# Patient Record
Sex: MALE | Race: AMERICAN INDIAN OR ALASKA NATIVE | NOT HISPANIC OR LATINO | URBAN - METROPOLITAN AREA
[De-identification: names, ages, dates, MRNs, and addresses within clinical notes are randomized per-mention and may not be internally consistent; named-entity substitution may affect disease eponyms.]

---

## 2019-04-02 ENCOUNTER — HISTORICAL (OUTPATIENT)
Dept: ADMINISTRATIVE | Facility: HOSPITAL | Age: 29
End: 2019-04-02

## 2019-04-02 LAB
ALBUMIN SERPL-MCNC: 5.2 G/DL (ref 3.5–5.5)
ALBUMIN/GLOB SERPL: 2 {RATIO} (ref 1.2–2.2)
ALP SERPL-CCNC: 81 IU/L (ref 39–117)
ALT SERPL-CCNC: 21 IU/L (ref 0–44)
AST SERPL-CCNC: 29 IU/L (ref 0–40)
BASOPHILS # BLD AUTO: 0.1 X10E3/UL (ref 0–0.2)
BASOPHILS NFR BLD AUTO: 1 %
BILIRUB SERPL-MCNC: 0.7 MG/DL (ref 0–1.2)
BUN SERPL-MCNC: 14 MG/DL (ref 6–20)
CALCIUM SERPL-MCNC: 10 MG/DL (ref 8.7–10.2)
CHLORIDE SERPL-SCNC: 104 MMOL/L (ref 96–106)
CHOLEST SERPL-MCNC: 129 MG/DL (ref 100–199)
CHOLEST/HDLC SERPL: 2.1 RATIO (ref 0–5)
CO2 SERPL-SCNC: 24 MMOL/L (ref 20–29)
CREAT SERPL-MCNC: 1.07 MG/DL (ref 0.76–1.27)
CREAT/UREA NIT SERPL: 13 (ref 9–20)
EOSINOPHIL # BLD AUTO: 0.2 X10E3/UL (ref 0–0.4)
EOSINOPHIL NFR BLD AUTO: 2 %
ERYTHROCYTE [DISTWIDTH] IN BLOOD BY AUTOMATED COUNT: 12.1 % (ref 12.3–15.4)
GLOBULIN SER-MCNC: 2.6 G/DL (ref 1.5–4.5)
GLUCOSE SERPL-MCNC: 90 MG/DL (ref 65–99)
HCT VFR BLD AUTO: 49.6 % (ref 37.5–51)
HDLC SERPL-MCNC: 61 MG/DL
HGB BLD-MCNC: 16.7 G/DL (ref 13–17.7)
LDLC SERPL CALC-MCNC: 59 MG/DL (ref 0–99)
LYMPHOCYTES # BLD AUTO: 2.4 X10E3/UL (ref 0.7–3.1)
LYMPHOCYTES NFR BLD AUTO: 28 %
MCH RBC QN AUTO: 30.5 PG (ref 26.6–33)
MCHC RBC AUTO-ENTMCNC: 33.7 G/DL (ref 31.5–35.7)
MCV RBC AUTO: 91 FL (ref 79–97)
MONOCYTES # BLD AUTO: 0.6 X10E3/UL (ref 0.1–0.9)
MONOCYTES NFR BLD AUTO: 7 %
NEUTROPHILS # BLD AUTO: 5.5 X10E3/UL (ref 1.4–7)
NEUTROPHILS NFR BLD AUTO: 62 %
PLATELET # BLD AUTO: 240 X10E3/UL (ref 150–379)
POTASSIUM SERPL-SCNC: 4.5 MMOL/L (ref 3.5–5.2)
PROT SERPL-MCNC: 7.8 G/DL (ref 6–8.5)
RBC # BLD AUTO: 5.48 X10(6)/MCL (ref 4.14–5.8)
SODIUM SERPL-SCNC: 144 MMOL/L (ref 134–144)
TRIGL SERPL-MCNC: 44 MG/DL (ref 0–149)
TSH SERPL-ACNC: 1.16 MIU/ML (ref 0.45–4.5)
VLDLC SERPL CALC-MCNC: 9 MG/DL (ref 5–40)
WBC # SPEC AUTO: 8.7 X10E3/UL (ref 3.4–10.8)

## 2020-10-13 ENCOUNTER — HISTORICAL (OUTPATIENT)
Dept: LAB | Facility: HOSPITAL | Age: 30
End: 2020-10-13

## 2020-10-27 ENCOUNTER — HISTORICAL (OUTPATIENT)
Dept: RESPIRATORY THERAPY | Facility: HOSPITAL | Age: 30
End: 2020-10-27

## 2022-04-09 ENCOUNTER — HISTORICAL (OUTPATIENT)
Dept: ADMINISTRATIVE | Facility: HOSPITAL | Age: 32
End: 2022-04-09

## 2022-04-15 ENCOUNTER — HOSPITAL ENCOUNTER (OUTPATIENT)
Dept: EMERGENCY MEDICINE | Facility: HOSPITAL | Age: 32
End: 2022-04-15
Attending: HOSPITALIST | Admitting: HOSPITALIST

## 2022-04-15 LAB
ABS NEUT (OLG): 5.12 (ref 2.1–9.2)
ALBUMIN SERPL-MCNC: 4.7 G/DL (ref 3.5–5)
ALBUMIN/GLOB SERPL: 1.3 {RATIO} (ref 1.1–2)
ALP SERPL-CCNC: 69 U/L (ref 40–150)
ALT SERPL-CCNC: 30 U/L (ref 0–55)
AST SERPL-CCNC: 26 U/L (ref 5–34)
BASOPHILS # BLD AUTO: 0.1 10*3/UL (ref 0–0.2)
BASOPHILS NFR BLD AUTO: 1 %
BILIRUB SERPL-MCNC: 0.8 MG/DL
BILIRUBIN DIRECT+TOT PNL SERPL-MCNC: 0.3 (ref 0–0.5)
BILIRUBIN DIRECT+TOT PNL SERPL-MCNC: 0.5 (ref 0–0.8)
BUN SERPL-MCNC: 11.1 MG/DL (ref 8.9–20.6)
CALCIUM SERPL-MCNC: 10.8 MG/DL (ref 8.7–10.5)
CHLORIDE SERPL-SCNC: 106 MMOL/L (ref 98–107)
CO2 SERPL-SCNC: 25 MMOL/L (ref 22–29)
CREAT SERPL-MCNC: 1 MG/DL (ref 0.73–1.18)
D DIMER PPP IA.FEU-MCNC: <0.27 (ref 0–0.5)
EOSINOPHIL # BLD AUTO: 0.1 10*3/UL (ref 0–0.9)
EOSINOPHIL NFR BLD AUTO: 2 %
ERYTHROCYTE [DISTWIDTH] IN BLOOD BY AUTOMATED COUNT: 11.9 % (ref 11.5–17)
GLOBULIN SER-MCNC: 3.7 G/DL (ref 2.4–3.5)
GLUCOSE SERPL-MCNC: 95 MG/DL (ref 74–100)
HCT VFR BLD AUTO: 52.2 % (ref 42–52)
HEMOLYSIS INTERF INDEX SERPL-ACNC: 68
HEMOLYSIS INTERF INDEX SERPL-ACNC: 68
HGB BLD-MCNC: 18.8 G/DL (ref 14–18)
ICTERIC INTERF INDEX SERPL-ACNC: 1
LIPEMIC INTERF INDEX SERPL-ACNC: 11
LYMPHOCYTES # BLD AUTO: 2.5 10*3/UL (ref 0.6–4.6)
LYMPHOCYTES NFR BLD AUTO: 29 %
MAGNESIUM SERPL-MCNC: 2.3 MG/DL (ref 1.6–2.6)
MANUAL DIFF? (OHS): NO
MCH RBC QN AUTO: 30.6 PG (ref 27–31)
MCHC RBC AUTO-ENTMCNC: 36 G/DL (ref 33–36)
MCV RBC AUTO: 84.9 FL (ref 80–94)
MONOCYTES # BLD AUTO: 0.8 10*3/UL (ref 0.1–1.3)
MONOCYTES NFR BLD AUTO: 9 %
NEUTROPHILS # BLD AUTO: 5.12 10*3/UL (ref 2.1–9.2)
NEUTROPHILS NFR BLD AUTO: 58 %
PLATELET # BLD AUTO: 312 10*3/UL (ref 130–400)
PMV BLD AUTO: 9.1 FL (ref 9.4–12.4)
POTASSIUM SERPL-SCNC: 4.9 MMOL/L (ref 3.5–5.1)
PROT SERPL-MCNC: 8.4 G/DL (ref 6.4–8.3)
RBC # BLD AUTO: 6.15 10*6/UL (ref 4.7–6.1)
SODIUM SERPL-SCNC: 139 MMOL/L (ref 136–145)
TROPONIN I SERPL-MCNC: <0.01 NG/ML (ref 0–0.04)
TSH SERPL-ACNC: 1.83 M[IU]/L (ref 0.35–4.94)
WBC # SPEC AUTO: 8.7 10*3/UL (ref 4.5–11.5)

## 2022-04-27 VITALS
OXYGEN SATURATION: 97 % | DIASTOLIC BLOOD PRESSURE: 99 MMHG | BODY MASS INDEX: 30.16 KG/M2 | HEIGHT: 74 IN | SYSTOLIC BLOOD PRESSURE: 162 MMHG | WEIGHT: 235 LBS

## 2022-05-01 NOTE — PROGRESS NOTES
Patient:   RAHUL PANDYA            MRN: [id number]            FIN: [id number]               Age:   28 years     Sex:  Male     :  1990   Associated Diagnoses:   Wellness examination; Traumatic dislocation of thumb   Author:   Kathryn Russell MD      Visit Information   Visit type:  Annual exam.    Accompanied by:  No one.    Source of history:  Self.    History limitation:  None.       Chief Complaint   Wellness      Well Adult History   Well Adult History             The patient presents for well adult exam.  The patient's general health status is described as good.  The patient's diet is described as He was good until Oct he got off his diet. He was trying to gain wt/strenght. He does eat vegitables but he's doing more junk food. .  Exercise: occasional.  Associated symptoms consist of none.  Medical encounters: Dental never went.  Complaint: Did dislocate thumb at workout last week but it came back into position on it's own.  .        Review of Systems   Constitutional:  Negative.    Eye:  Negative.    Ear/Nose/Mouth/Throat:  PND in AM for about 15 min, No sore throat.    Respiratory:  Negative.    Cardiovascular:  Palpitations, No chest pain.    Gastrointestinal:  Negative.    Genitourinary:  Negative.    Hematology/Lymphatics:  Negative.    Endocrine:  Negative.    Immunologic:  Negative.    Musculoskeletal:  Negative except as documented in history of present illness.    Integumentary:  Negative.    Neurologic:  Numbness, in thumb where he popped his thumb out.    Psychiatric:  Anxiety,  March but work is what is giving him stress. .       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   Family History:    Primary malignant neoplasm of breast  Aunt  Aunt  Diabetes mellitus type  2  Aunt  Alzheimer's disease  Grandfather ()  Hypertension.  Father  Sister  Hypothyroidism.  Mother (Jeimy)     Procedure history:    Tonsillectomy (SNOMED CT 131846626).   Social History        Social & Psychosocial Habits    Alcohol  2018  Use: Never    Employment/School  2018  Status: Employed    Description: [employer]    Substance Abuse  2018  Use: Never    Tobacco  2019  Use: Former smoker, quit more    Type: Cigarettes    Patient Wants Consult For Cessation Counseling No    Started at age: 21 Years    Stopped at age: 22 Years    Previous treatment: None    Smokeless tobacco use: Never.        Physical Examination   Vital Signs   2019 10:18 CDT       Temperature Oral          97.4 degF                             Temperature Oral (calculated)             207.32 DegF                             Peripheral Pulse Rate     68 bpm                             Respiratory Rate          16 br/min                             Systolic Blood Pressure   137 mmHg                             Diastolic Blood Pressure  89 mmHg                             Blood Pressure Location   Left arm                             Manual Cuff BP            No     Measurements from flowsheet : Measurements   2019 10:18 CDT       Weight Measured and Calculated in Lbs     223.99 lb                             Body Mass Index Measured  28.76 kg/m2     General:  Alert and oriented.    Eye:  Extraocular movements are intact, Normal conjunctiva.    HENT:  Tympanic membranes are clear, Oral mucosa is moist, No pharyngeal erythema.    Neck:  Non-tender, No jugular venous distention.    Respiratory:  Lungs are clear to auscultation.    Cardiovascular:  Normal rate, Regular rhythm, No gallop.    Gastrointestinal:  Soft, Non-tender, Non-distended.    Genitourinary:  No costovertebral angle tenderness, No scrotal tenderness.    Lymphatics:  No lymphadenopathy neck, axilla, groin.    Musculoskeletal:   Normal range of motion, Normal strength, No swelling, Normal gait, ROM on the thumb is fairly good.    Integumentary:  Warm, Dry, Pink.    Neurologic:  Alert, Oriented, No focal deficits.    Psychiatric:  Cooperative, Appropriate mood & affect.       Review / Management   Differential diagnosis:  Annual exam doing well, but I would want him to get back with his medication daily for stress managment and reduce the junk food. I will get lab today with thyroid since it runs in his family.   Thumb injury appears to be resolving but urged him to take care of the area to prevent long term ligament laxity.   Anxiety with stress at work, continue exercise with deep breathing. .    Results review:  Lab results   3/29/2018 0:00 CDT       WBC LC Amb                7.7 x10E3/uL                             Hemoglobin LC Kevyn        15.2 g/dL                             Hemacrit LC Amb           42.4 %                             Platelets LC Amb          262 x10E3/uL                             Na Ser LC Amb             143 mmol/L                             K Ser LC Amb              4.0 mmol/L                             Cl Ser LC Amb             103 mmol/L                             CO2 Ser LC Amb            28 mmol/L                             CA Ser LC Amb             9.6 mg/dL                             Gluc Ser LC AMB           78 mg/dL                             BUN Ser LC Amb            13 mg/dL                             Creat Ser LC Amb          1.01 mg/dL                             BUN/Creat Ratio LC Amb    13                             TSH LC Amb                0.828  .       Impression and Plan   Diagnosis     Wellness examination (SID08-MC Z00.00).     Traumatic dislocation of thumb (YXF13-RE S63.106A).     Patient Instructions:  Health Maintenance, Male.         Counseled: Patient, Regarding medications, Verbalized understanding, no barrier to care.    Orders     Orders   Laboratory:  Lipid Panel (Order):  Routine collect, 4/2/2019 10:47 CDT, Blood, [facility], Stop date 4/2/2019 10:47 CDT, Lab Collect, Wellness examination, 4/2/2019 10:47 CDT  TSH (Order): Routine collect, 4/2/2019 10:47 CDT, Blood, [facility], Stop date 4/2/2019 10:47 CDT, Lab Collect, Wellness examination, 4/2/2019 10:47 CDT  Urinalysis Complete a reflex to culture (Order): Routine collect, Urine, [facility], 4/2/2019 10:47 CDT, Stop date 4/2/2019 10:47 CDT, Nurse collect, Wellness examination  CMP (Order): Routine collect, 4/2/2019 10:47 CDT, Blood, [facility], Stop date 4/2/2019 10:47 CDT, Lab Collect, Wellness examination, 4/2/2019 10:47 CDT  CBC (Order): Routine collect, 4/2/2019 10:47 CDT, Blood, [facility], Stop date 4/2/2019 10:47 CDT, Lab Collect, Wellness examination, 4/2/2019 10:47 CDT  Evaluation and Management:  Preventative Health Care Est 18-39 years [id number] PC (Order): Wellness examination  Traumatic dislocation of thumb, ESTHER Russell MD Clinic, 4/2/2019 10:46 CDT  Ambulatory Referrals:  Clinic Follow-Up Wellness (Order): *Est. 4/2/2020 3:00 CDT, Order for future visit, Wellness examination, Kathryn Russell MD  Ambulatory Procedures:  Routine Spec Collect Venipuncture - Lab blood collect [id number] PC (Order): 4/2/2019 10:47 CDT, ESTHER Russell MD Clinic, Routine.

## 2022-05-06 DIAGNOSIS — I48.0 PAROXYSMAL ATRIAL FIBRILLATION: ICD-10-CM

## 2022-05-06 DIAGNOSIS — I10 PRIMARY HYPERTENSION: ICD-10-CM

## 2022-05-06 PROBLEM — M26.629 TEMPOROMANDIBULAR JOINT PAIN-DYSFUNCTION SYNDROME: Status: ACTIVE | Noted: 2022-05-06

## 2022-05-06 PROBLEM — F41.9 ANXIETY: Status: ACTIVE | Noted: 2022-05-06

## 2022-05-11 ENCOUNTER — ANESTHESIA EVENT (OUTPATIENT)
Dept: CARDIOLOGY | Facility: HOSPITAL | Age: 32
End: 2022-05-11
Payer: COMMERCIAL

## 2022-05-11 DIAGNOSIS — I48.0 PAROXYSMAL ATRIAL FIBRILLATION: Primary | ICD-10-CM

## 2022-05-11 RX ORDER — ENOXAPARIN SODIUM 150 MG/ML
120 INJECTION SUBCUTANEOUS 2 TIMES DAILY
Status: ON HOLD | COMMUNITY
Start: 2022-04-29 | End: 2022-06-09 | Stop reason: HOSPADM

## 2022-05-11 RX ORDER — FLECAINIDE ACETATE 50 MG/1
1 TABLET ORAL 2 TIMES DAILY
Status: ON HOLD | COMMUNITY
Start: 2022-05-09 | End: 2022-06-09 | Stop reason: HOSPADM

## 2022-05-11 RX ORDER — DILTIAZEM HYDROCHLORIDE 240 MG/1
240 CAPSULE, COATED, EXTENDED RELEASE ORAL DAILY
Status: ON HOLD | COMMUNITY
Start: 2022-04-18 | End: 2022-06-09 | Stop reason: CLARIF

## 2022-05-11 RX ORDER — APIXABAN 5 MG/1
5 TABLET, FILM COATED ORAL 2 TIMES DAILY
COMMUNITY
Start: 2022-04-19 | End: 2023-04-20 | Stop reason: ALTCHOICE

## 2022-05-11 RX ORDER — DILTIAZEM HYDROCHLORIDE 120 MG/1
120 CAPSULE, COATED, EXTENDED RELEASE ORAL DAILY
Status: ON HOLD | COMMUNITY
Start: 2022-04-15 | End: 2022-06-09 | Stop reason: CLARIF

## 2022-05-11 RX ORDER — ALPRAZOLAM 1 MG/1
1 TABLET ORAL 2 TIMES DAILY
COMMUNITY
Start: 2022-04-28

## 2022-05-12 ENCOUNTER — HOSPITAL ENCOUNTER (OUTPATIENT)
Dept: CARDIOLOGY | Facility: HOSPITAL | Age: 32
Discharge: HOME | End: 2022-05-12
Attending: INTERNAL MEDICINE
Payer: COMMERCIAL

## 2022-05-12 ENCOUNTER — ANESTHESIA (OUTPATIENT)
Dept: CARDIOLOGY | Facility: HOSPITAL | Age: 32
End: 2022-05-12
Payer: COMMERCIAL

## 2022-05-12 ENCOUNTER — HOSPITAL ENCOUNTER (OUTPATIENT)
Facility: HOSPITAL | Age: 32
Discharge: HOME | End: 2022-05-12
Attending: INTERNAL MEDICINE | Admitting: INTERNAL MEDICINE
Payer: COMMERCIAL

## 2022-05-12 VITALS
HEIGHT: 75 IN | HEART RATE: 88 BPM | DIASTOLIC BLOOD PRESSURE: 76 MMHG | RESPIRATION RATE: 17 BRPM | SYSTOLIC BLOOD PRESSURE: 136 MMHG | BODY MASS INDEX: 31.47 KG/M2 | TEMPERATURE: 98 F | OXYGEN SATURATION: 96 % | WEIGHT: 253.06 LBS

## 2022-05-12 VITALS
DIASTOLIC BLOOD PRESSURE: 69 MMHG | HEART RATE: 60 BPM | OXYGEN SATURATION: 99 % | SYSTOLIC BLOOD PRESSURE: 119 MMHG | RESPIRATION RATE: 11 BRPM

## 2022-05-12 DIAGNOSIS — I48.0 PAF (PAROXYSMAL ATRIAL FIBRILLATION): ICD-10-CM

## 2022-05-12 DIAGNOSIS — I48.0 PAROXYSMAL ATRIAL FIBRILLATION: ICD-10-CM

## 2022-05-12 DIAGNOSIS — I48.91 ATRIAL FIBRILLATION: ICD-10-CM

## 2022-05-12 DIAGNOSIS — R06.09 CHRONIC DYSPNEA: ICD-10-CM

## 2022-05-12 LAB
ANION GAP SERPL CALC-SCNC: 8 MEQ/L
BSA FOR ECHO PROCEDURE: 2.46 M2
BUN SERPL-MCNC: 15.6 MG/DL (ref 8.9–20.6)
CALCIUM SERPL-MCNC: 9.6 MG/DL (ref 8.4–10.2)
CHLORIDE SERPL-SCNC: 107 MMOL/L (ref 98–107)
CO2 SERPL-SCNC: 27 MMOL/L (ref 22–29)
CREAT SERPL-MCNC: 1.08 MG/DL (ref 0.73–1.18)
CREAT/UREA NIT SERPL: 14
GLUCOSE SERPL-MCNC: 88 MG/DL (ref 74–100)
POTASSIUM SERPL-SCNC: 4.4 MMOL/L (ref 3.5–5.1)
SODIUM SERPL-SCNC: 142 MMOL/L (ref 136–145)

## 2022-05-12 PROCEDURE — 93005 ELECTROCARDIOGRAM TRACING: CPT | Mod: 59

## 2022-05-12 PROCEDURE — C1769 GUIDE WIRE: HCPCS | Performed by: INTERNAL MEDICINE

## 2022-05-12 PROCEDURE — 93656 COMPRE EP EVAL ABLTJ ATR FIB: CPT | Performed by: INTERNAL MEDICINE

## 2022-05-12 PROCEDURE — 25000003 PHARM REV CODE 250: Performed by: INTERNAL MEDICINE

## 2022-05-12 PROCEDURE — 93010 ELECTROCARDIOGRAM REPORT: CPT | Mod: XE,,, | Performed by: INTERNAL MEDICINE

## 2022-05-12 PROCEDURE — C1766 INTRO/SHEATH,STRBLE,NON-PEEL: HCPCS | Performed by: INTERNAL MEDICINE

## 2022-05-12 PROCEDURE — 37000009 HC ANESTHESIA EA ADD 15 MINS: Performed by: INTERNAL MEDICINE

## 2022-05-12 PROCEDURE — 25000003 PHARM REV CODE 250: Performed by: NURSE ANESTHETIST, CERTIFIED REGISTERED

## 2022-05-12 PROCEDURE — C1753 CATH, INTRAVAS ULTRASOUND: HCPCS | Performed by: INTERNAL MEDICINE

## 2022-05-12 PROCEDURE — C1894 INTRO/SHEATH, NON-LASER: HCPCS | Performed by: INTERNAL MEDICINE

## 2022-05-12 PROCEDURE — 93010 EKG 12-LEAD: ICD-10-PCS | Mod: XE,,, | Performed by: INTERNAL MEDICINE

## 2022-05-12 PROCEDURE — 37000008 HC ANESTHESIA 1ST 15 MINUTES: Performed by: INTERNAL MEDICINE

## 2022-05-12 PROCEDURE — C1730 CATH, EP, 19 OR FEW ELECT: HCPCS | Performed by: INTERNAL MEDICINE

## 2022-05-12 PROCEDURE — 36415 COLL VENOUS BLD VENIPUNCTURE: CPT | Performed by: INTERNAL MEDICINE

## 2022-05-12 PROCEDURE — C1733 CATH, EP, OTHR THAN COOL-TIP: HCPCS | Performed by: INTERNAL MEDICINE

## 2022-05-12 PROCEDURE — 80048 BASIC METABOLIC PNL TOTAL CA: CPT | Performed by: INTERNAL MEDICINE

## 2022-05-12 PROCEDURE — 63600175 PHARM REV CODE 636 W HCPCS: Performed by: NURSE ANESTHETIST, CERTIFIED REGISTERED

## 2022-05-12 PROCEDURE — 93325 DOPPLER ECHO COLOR FLOW MAPG: CPT

## 2022-05-12 PROCEDURE — 27201423 OPTIME MED/SURG SUP & DEVICES STERILE SUPPLY: Performed by: INTERNAL MEDICINE

## 2022-05-12 PROCEDURE — 63600175 PHARM REV CODE 636 W HCPCS: Performed by: ANESTHESIOLOGY

## 2022-05-12 RX ORDER — SODIUM CHLORIDE 0.9 % (FLUSH) 0.9 %
10 SYRINGE (ML) INJECTION
Status: DISCONTINUED | OUTPATIENT
Start: 2022-05-12 | End: 2022-05-13 | Stop reason: HOSPADM

## 2022-05-12 RX ORDER — LIDOCAINE HYDROCHLORIDE 20 MG/ML
INJECTION, SOLUTION INFILTRATION; PERINEURAL
Status: DISCONTINUED | OUTPATIENT
Start: 2022-05-12 | End: 2022-05-12 | Stop reason: HOSPADM

## 2022-05-12 RX ORDER — ONDANSETRON HYDROCHLORIDE 2 MG/ML
INJECTION, SOLUTION INTRAMUSCULAR; INTRAVENOUS
Status: DISCONTINUED | OUTPATIENT
Start: 2022-05-12 | End: 2022-05-12

## 2022-05-12 RX ORDER — DEXAMETHASONE SODIUM PHOSPHATE 4 MG/ML
INJECTION, SOLUTION INTRA-ARTICULAR; INTRALESIONAL; INTRAMUSCULAR; INTRAVENOUS; SOFT TISSUE
Status: DISCONTINUED | OUTPATIENT
Start: 2022-05-12 | End: 2022-05-12

## 2022-05-12 RX ORDER — PHENYLEPHRINE HCL IN 0.9% NACL 1 MG/10 ML
SYRINGE (ML) INTRAVENOUS
Status: DISCONTINUED | OUTPATIENT
Start: 2022-05-12 | End: 2022-05-12

## 2022-05-12 RX ORDER — PROPOFOL 10 MG/ML
VIAL (ML) INTRAVENOUS
Status: DISCONTINUED | OUTPATIENT
Start: 2022-05-12 | End: 2022-05-12

## 2022-05-12 RX ORDER — ONDANSETRON 2 MG/ML
4 INJECTION INTRAMUSCULAR; INTRAVENOUS DAILY PRN
Status: DISCONTINUED | OUTPATIENT
Start: 2022-05-12 | End: 2022-05-12 | Stop reason: HOSPADM

## 2022-05-12 RX ORDER — FENTANYL CITRATE 50 UG/ML
INJECTION, SOLUTION INTRAMUSCULAR; INTRAVENOUS
Status: DISCONTINUED | OUTPATIENT
Start: 2022-05-12 | End: 2022-05-12

## 2022-05-12 RX ORDER — ROCURONIUM BROMIDE 10 MG/ML
INJECTION, SOLUTION INTRAVENOUS
Status: DISCONTINUED | OUTPATIENT
Start: 2022-05-12 | End: 2022-05-12

## 2022-05-12 RX ORDER — SODIUM CHLORIDE, SODIUM GLUCONATE, SODIUM ACETATE, POTASSIUM CHLORIDE AND MAGNESIUM CHLORIDE 30; 37; 368; 526; 502 MG/100ML; MG/100ML; MG/100ML; MG/100ML; MG/100ML
1000 INJECTION, SOLUTION INTRAVENOUS CONTINUOUS
Status: DISCONTINUED | OUTPATIENT
Start: 2022-05-12 | End: 2022-05-12 | Stop reason: HOSPADM

## 2022-05-12 RX ORDER — HEPARIN SODIUM 10000 [USP'U]/100ML
INJECTION, SOLUTION INTRAVENOUS CONTINUOUS PRN
Status: DISCONTINUED | OUTPATIENT
Start: 2022-05-12 | End: 2022-05-12

## 2022-05-12 RX ORDER — SODIUM CHLORIDE 0.9 % (FLUSH) 0.9 %
10 SYRINGE (ML) INJECTION
Status: ACTIVE | OUTPATIENT
Start: 2022-05-12

## 2022-05-12 RX ORDER — HEPARIN SODIUM 1000 [USP'U]/ML
INJECTION, SOLUTION INTRAVENOUS; SUBCUTANEOUS
Status: DISCONTINUED | OUTPATIENT
Start: 2022-05-12 | End: 2022-05-12

## 2022-05-12 RX ORDER — LIDOCAINE HYDROCHLORIDE 20 MG/ML
INJECTION, SOLUTION EPIDURAL; INFILTRATION; INTRACAUDAL; PERINEURAL
Status: DISCONTINUED | OUTPATIENT
Start: 2022-05-12 | End: 2022-05-12

## 2022-05-12 RX ORDER — KETOROLAC TROMETHAMINE 30 MG/ML
15 INJECTION, SOLUTION INTRAMUSCULAR; INTRAVENOUS EVERY 6 HOURS PRN
Status: DISCONTINUED | OUTPATIENT
Start: 2022-05-12 | End: 2022-05-12 | Stop reason: HOSPADM

## 2022-05-12 RX ORDER — PROTAMINE SULFATE 10 MG/ML
INJECTION, SOLUTION INTRAVENOUS
Status: DISCONTINUED | OUTPATIENT
Start: 2022-05-12 | End: 2022-05-12

## 2022-05-12 RX ORDER — ACETAMINOPHEN 325 MG/1
650 TABLET ORAL EVERY 4 HOURS PRN
Status: DISCONTINUED | OUTPATIENT
Start: 2022-05-12 | End: 2022-05-12 | Stop reason: HOSPADM

## 2022-05-12 RX ORDER — HYDROCODONE BITARTRATE AND ACETAMINOPHEN 10; 325 MG/1; MG/1
1 TABLET ORAL EVERY 4 HOURS PRN
Status: DISCONTINUED | OUTPATIENT
Start: 2022-05-12 | End: 2022-05-12 | Stop reason: HOSPADM

## 2022-05-12 RX ORDER — SUCCINYLCHOLINE CHLORIDE 20 MG/ML
INJECTION INTRAMUSCULAR; INTRAVENOUS
Status: DISCONTINUED | OUTPATIENT
Start: 2022-05-12 | End: 2022-05-12

## 2022-05-12 RX ADMIN — HEPARIN SODIUM 6000 UNITS: 1000 INJECTION, SOLUTION INTRAVENOUS; SUBCUTANEOUS at 10:05

## 2022-05-12 RX ADMIN — FENTANYL CITRATE 100 MCG: 50 INJECTION, SOLUTION INTRAMUSCULAR; INTRAVENOUS at 08:05

## 2022-05-12 RX ADMIN — ROCURONIUM BROMIDE 5 MG: 10 SOLUTION INTRAVENOUS at 08:05

## 2022-05-12 RX ADMIN — Medication 100 MCG: at 09:05

## 2022-05-12 RX ADMIN — HEPARIN SODIUM 6000 UNITS: 1000 INJECTION, SOLUTION INTRAVENOUS; SUBCUTANEOUS at 09:05

## 2022-05-12 RX ADMIN — PROPOFOL 20 MG: 10 INJECTION, EMULSION INTRAVENOUS at 08:05

## 2022-05-12 RX ADMIN — LIDOCAINE HYDROCHLORIDE 40 MG: 20 INJECTION, SOLUTION EPIDURAL; INFILTRATION; INTRACAUDAL; PERINEURAL at 08:05

## 2022-05-12 RX ADMIN — Medication 100 MCG: at 10:05

## 2022-05-12 RX ADMIN — HEPARIN SODIUM 3000 UNITS: 1000 INJECTION, SOLUTION INTRAVENOUS; SUBCUTANEOUS at 10:05

## 2022-05-12 RX ADMIN — DEXAMETHASONE SODIUM PHOSPHATE 4 MG: 4 INJECTION, SOLUTION INTRA-ARTICULAR; INTRALESIONAL; INTRAMUSCULAR; INTRAVENOUS; SOFT TISSUE at 08:05

## 2022-05-12 RX ADMIN — SODIUM CHLORIDE, POTASSIUM CHLORIDE, SODIUM LACTATE AND CALCIUM CHLORIDE: 600; 310; 30; 20 INJECTION, SOLUTION INTRAVENOUS at 08:05

## 2022-05-12 RX ADMIN — FENTANYL CITRATE 50 MCG: 50 INJECTION, SOLUTION INTRAMUSCULAR; INTRAVENOUS at 09:05

## 2022-05-12 RX ADMIN — SODIUM CHLORIDE, POTASSIUM CHLORIDE, SODIUM LACTATE AND CALCIUM CHLORIDE: 600; 310; 30; 20 INJECTION, SOLUTION INTRAVENOUS at 10:05

## 2022-05-12 RX ADMIN — Medication 200 MCG: at 10:05

## 2022-05-12 RX ADMIN — ONDANSETRON 4 MG: 2 INJECTION INTRAMUSCULAR; INTRAVENOUS at 08:05

## 2022-05-12 RX ADMIN — PROPOFOL 100 MG: 10 INJECTION, EMULSION INTRAVENOUS at 08:05

## 2022-05-12 RX ADMIN — SUCCINYLCHOLINE CHLORIDE 200 MG: 20 INJECTION, SOLUTION INTRAMUSCULAR; INTRAVENOUS at 08:05

## 2022-05-12 RX ADMIN — PROTAMINE SULFATE 50 MG: 10 INJECTION, SOLUTION INTRAVENOUS at 10:05

## 2022-05-12 RX ADMIN — FENTANYL CITRATE 50 MCG: 50 INJECTION, SOLUTION INTRAMUSCULAR; INTRAVENOUS at 11:05

## 2022-05-12 RX ADMIN — PROPOFOL 40 MG: 10 INJECTION, EMULSION INTRAVENOUS at 08:05

## 2022-05-12 RX ADMIN — HEPARIN SODIUM AND DEXTROSE 1000 UNITS/HR: 10000; 5 INJECTION INTRAVENOUS at 10:05

## 2022-05-12 RX ADMIN — ONDANSETRON 4 MG: 2 INJECTION INTRAMUSCULAR; INTRAVENOUS at 01:05

## 2022-05-12 NOTE — DISCHARGE SUMMARY
[facility] - Cath Lab Services  Discharge Note  Short Stay    Procedure(s) (LRB):  ABLATION, ATRIAL FIBRILLATION, CRYO (N/A)  ECHOCARDIOGRAM,TRANSESOPHAGEAL (N/A)    OUTCOME: Patient tolerated treatment/procedure well without complication and is now ready for discharge.    DISPOSITION: Home or Self Care    FINAL DIAGNOSIS:  Paroxysmal atrial fibrillation    FOLLOWUP: In clinic    DISCHARGE INSTRUCTIONS:    Discharge Procedure Orders   Diet Cardiac     Lifting restrictions   Order Comments: Do not lift > 15 pounds.     Remove dressing in 24 hours     Notify your health care provider if you experience any of the following:  temperature >100.4     Notify your health care provider if you experience any of the following:  persistent nausea and vomiting or diarrhea     Notify your health care provider if you experience any of the following:  severe uncontrolled pain     Notify your health care provider if you experience any of the following:  redness, tenderness, or signs of infection (pain, swelling, redness, odor or green/yellow discharge around incision site)        TIME SPENT ON DISCHARGE: 10 minutes

## 2022-05-12 NOTE — ANESTHESIA PREPROCEDURE EVALUATION
05/12/2022  Scott Montiel is a 31 y.o., male.  ABLATION, ATRIAL FIBRILLATION,       Pre-op Assessment          Review of Systems  Anesthesia Hx:  No problems with previous Anesthesia  Denies Family Hx of Anesthesia complications.    Cardiovascular:  Cardiovascular Normal  No Cardiac Complaints   Pulmonary:  Pulmonary Normal No Pulmonary Complaints   Hepatic/GI:   No Current GERD Sx       Physical Exam  General: Alert and Oriented    Airway:  Mallampati: II   Mouth Opening: Normal  TM Distance: Normal  Tongue: Normal  Neck ROM: Normal ROM    Dental:  Intact    Chest/Lungs:  Clear to auscultation, Normal Respiratory Rate    Heart:  Rate: Normal  Rhythm: Regular Rhythm        Anesthesia Plan  Type of Anesthesia, risks & benefits discussed:    Anesthesia Type: Gen ETT  Intra-op Monitoring Plan: Standard ASA Monitors and Art Line  Post Op Pain Control Plan: multimodal analgesia  Induction:  IV and Inhalation  Airway Plan: Direct, Post-Induction  Informed Consent: Informed consent signed with the Patient and all parties understand the risks and agree with anesthesia plan.  All questions answered. Patient consented to blood products? Yes  ASA Score: 2  Day of Surgery Review of History & Physical: H&P Update referred to the surgeon/provider.  Anesthesia Plan Notes: Discussed Anesthetic Plan w/ Pt/Family. Questions Entertained. Accepted.    Ready For Surgery From Anesthesia Perspective.       Latest Reference Range & Units 04/02/19 10:47   WBC 3.4 - 10.8 x10E3/uL 8.7   RBC 4.14 - 5.80 x10(6)/mcL 5.48   Hemoglobin 13.0 - 17.7 g/dL 16.7   Hematocrit 37.5 - 51.0 % 49.6   MCV 79 - 97 fL 91   MCH 26.6 - 33.0 pg 30.5   MCHC 31.5 - 35.7 g/dL 33.7   RDW 12.3 - 15.4 % 12.1 (L)   Platelets 150 - 379 x10E3/uL 240   (L): Data is abnormally low   Latest Reference Range & Units 04/02/19 10:47   Sodium 134 - 144 mmol/L 144    Potassium 3.5 - 5.2 mmol/L 4.5   Chloride 96 - 106 mmol/L 104   CO2 20 - 29 mmol/L 24   BUN 6 - 20 mg/dL 14 [1]   Creatinine 0.76 - 1.27 mg/dL 1.07   [1] Specimen Received d/t:  04/03/2019 00:00:                      .

## 2022-05-12 NOTE — ANESTHESIA POSTPROCEDURE EVALUATION
Anesthesia Post Evaluation    Patient: Scott Montiel    Procedure(s) Performed: Procedure(s) (LRB):  ABLATION, ATRIAL FIBRILLATION, CRYO (N/A)  ECHOCARDIOGRAM,TRANSESOPHAGEAL (N/A)    Final Anesthesia Type: general      Patient location during evaluation: PACU  Patient participation: Yes- Able to Participate  Level of consciousness: awake and alert and oriented  Post-procedure vital signs: reviewed and stable  Pain management: adequate  Airway patency: patent  KERRY mitigation strategies: Multimodal analgesia and Verification of full reversal of neuromuscular block  PONV status at discharge: No PONV  Anesthetic complications: no      Cardiovascular status: hemodynamically stable  Respiratory status: unassisted  Hydration status: euvolemic  Follow-up not needed.          Vitals Value Taken Time   /76 05/12/22 1645   Temp 36 05/12/22 1831   Pulse 88 05/12/22 1700   Resp 17 05/12/22 1700   SpO2 96 % 05/12/22 1700         No case tracking events are documented in the log.      Pain/Von Score: Von Score: 10 (5/12/2022 12:56 PM)

## 2022-05-12 NOTE — TRANSFER OF CARE
"Anesthesia Transfer of Care Note    Patient: Scott Montiel    Procedure(s) Performed: Procedure(s) (LRB):  ABLATION, ATRIAL FIBRILLATION, CRYO (N/A)  ECHOCARDIOGRAM,TRANSESOPHAGEAL (N/A)    Patient location: PACU    Anesthesia Type: general    Transport from OR: Transported from OR on room air with adequate spontaneous ventilation. Continuous SpO2 monitoring in transport. Continuous ECG monitoring in transport    Post pain: adequate analgesia    Post assessment: no apparent anesthetic complications    Post vital signs: stable    Level of consciousness: awake and alert    Complications: none    Transfer of care protocol was followed      Last vitals:   Visit Vitals  BP (!) 144/93 (Patient Position: Sitting)   Temp 36.6 °C (97.9 °F) (Oral)   Resp 18   Ht 6' 2.61" (1.895 m)   Wt 114.8 kg (253 lb 1.4 oz)   SpO2 100%   BMI 31.97 kg/m²     "

## 2022-05-13 ENCOUNTER — PATIENT MESSAGE (OUTPATIENT)
Dept: ADMINISTRATIVE | Facility: OTHER | Age: 32
End: 2022-05-13
Payer: COMMERCIAL

## 2022-05-14 NOTE — ED PROVIDER NOTES
"   Patient:   RAHUL PANDYA            MRN: [id number]            FIN: [id number]               Age:   31 years     Sex:  Male     :  1990   Associated Diagnoses:   Atrial fibrillation with RVR; New onset a-fib   Author:   Bobbi Iniguez MD      Basic Information   Time seen: Date & time 4/15/2022 08:10:00.   History source: Patient.   Arrival mode: Private vehicle.   History limitation: None.   Additional information: Patient's physician(s): [facility], Chief Complaint from Nursing Triage Note : Chief Complaint   4/15/2022 7:53 CDT       Chief Complaint           c/o intermittent palpitations since wednesday with chest pain and sob since this morning. denies cardiac hx. episode of light headedness while at work this morning  .      History of Present Illness   The patient presents with 31-year-old male with no significant past medical history presents to the emergency department for evaluation of intermittent chest pain, shortness of breath and palpitations since late last night.  He had an episode of lightheadedness, feeling like he might pass out at work today so he decided to come in for evaluation.  He is actually had multiple episodes over the last month and has been in contact with his primary care physician.  He cut out alcohol, baseline drinks a sixpack daily on weekends and a couple of drinks per night during the week, also cut out marijuana use since Monday.  He denies any fever or chills.  He was seen by cardiology a year or 2 ago for similar symptoms, reports that outpatient work-up was unremarkable for any acute etiology of his palpitations.  Reports negative echocardiogram at that time..  The onset was "last night" 22.  The course/duration of symptoms is fluctuating in intensity.  Location: generalized. Radiating pain: none. The degree at onset was moderate.  The degree at maximum was moderate.  The degree at present is moderate.  The exacerbating factor is lying down.  The relieving " factor is none.  Associated symptoms: shortness of breath, diaphoresis, anxiety and palpitations.        Review of Systems   Constitutional symptoms:  No fever, no chills.    Skin symptoms:  No jaundice, no rash   Eye symptoms:  Vision unchangedNo blurred vision,    Respiratory symptoms:  Shortness of breathNo orthopnea, no cough, no sputum production.    Cardiovascular symptoms:  Chest pain, intermittent, palpitationsNo diaphoresis, no peripheral edema.    Gastrointestinal symptoms:  No abdominal pain, no nausea, no vomiting, no diarrhea, no constipation.    Genitourinary symptoms:  No dysuria, no hematuria   Neurologic symptoms:  No headache, no dizziness.    Hematologic/Lymphatic symptoms:  Bleeding tendency negative,    Allergy/immunologic symptoms:  No impaired immunity,       Health Status   Allergies: No known allergies.   Medications:  (Selected)   Inpatient Medications  Ordered  metoprolol tartrate 1 mg/mL injectable solution: 5 mg, form: Injection, IV, q5min PRN for tachycardia, Order duration: 3 dose(s), first dose 04/15/22 8:06:00 CDT, stop date Limited # of times, STAT, hold for SBP < 100  Prescriptions  Prescribed  ProAir HFA 90 mcg/inh inhalation aerosol with adapter: 1 puff(s), INH, q6hr, PRN PRN for wheezing, # 1 EA, 5 Refill(s), Pharmacy: [facility], 187.96, cm, Height/Length Dosing, 10/08/20 14:28:00 CDT, 102.05, kg, Weight Dosing, 10/08/20 14:28:00 CDT.      Past Medical/ Family/ Social History   Medical history:    Resolved  Murmur ([id number]):  Resolved.  Traumatic dislocation of thumb ([id number]):  Resolved.  Attention deficit disorder without hyperactivity ([id number]):  Resolved.  Comments:  10/8/2020 CDT 15:06 CDT - Drew HAQUE, Kathryn BROWER  occupational.   Surgical history:    Tonsillectomy ([id number])..   Family history:    Primary malignant neoplasm of breast  Aunt  Aunt  Diabetes mellitus type 2  Aunt  Alzheimer's disease  Grandfather ()  Disorder of immune system  Mother  (Jeimy)  Hypertension.  Father  Sister  Hypothyroidism.  Mother (Jeimy)  .   Social history: Alcohol use: past regular use, states quit this week, Tobacco use: past tobacco use, Drug use: Marijuana, states quit this week.      Physical Examination               Vital Signs   Vital Signs   4/15/2022 8:15 CDT       Peripheral Pulse Rate     143 bpm  HI                             Heart Rate Monitored      142 bpm  HI                             Respiratory Rate          19 br/min                             SpO2                      97 %                             Oxygen Therapy            Room air                             Systolic Blood Pressure   119 mmHg                             Diastolic Blood Pressure  83 mmHg                             Mean Arterial Pressure, Cuff              95 mmHg    4/15/2022 7:53 CDT       Temperature Oral          36.7 DegC                             Temperature Oral (calculated)             98.06 DegF                             Peripheral Pulse Rate     138 bpm  HI                             Respiratory Rate          20 br/min                             SpO2                      100 %                             Oxygen Therapy            Room air                             Systolic Blood Pressure   147 mmHg  HI                             Diastolic Blood Pressure  106 mmHg  HI  .   Measurements   4/15/2022 7:53 CDT       Weight Dosing             113.5 kg                             Weight Measured and Calculated in Lbs     250.22 lb                             Weight Estimated          113.5 kg                             Height/Length Dosing      187 cm                             Height/Length Estimated   187 cm                             Body Mass Index Estimated 32.46 kg/m2  .   Basic Oxygen Information   4/15/2022 8:15 CDT       SpO2                      97 %                             Oxygen Therapy            Room air    4/15/2022 7:53 CDT       SpO2                       100 %                             Oxygen Therapy            Room air  .   General:  Alert, no acute distress   Skin:  diaphoretic   Head:  Normocephalic, atraumatic   Neck:  Supple, trachea midline   Eye:  Normal conjunctiva   Ears, nose, mouth and throat:  Oral mucosa moist   Cardiovascular:  No murmur, Normal peripheral perfusion, No edema, irregularly irregular rhythm, Tachycardia   Respiratory:  Lungs are clear to auscultation, respirations are non-labored.    Gastrointestinal:  Soft, Nontender, Non distended, Normal bowel sounds   Neurological:  Alert and oriented to person, place, time, and situation.   Psychiatric:  Cooperative      Medical Decision Making   Rationale:  Mr. Montiel presented to the emergency department with frequent palpitations, today now in A. fib RVR.  Attempted rate control with metoprolol pushes; however, ventricular rate still in the 130s and still symptomatic.  He was given a bolus dose of Cardizem and initially developed significant bradycardic atrial fibrillation though remained with stable BP.  He was feeling improved at this time.  Laboratory studies including cardiac enzymes and D-dimer negative.  Case discussed with cardiology who requested the patient be admitted to the hospital medicine service given his underlying significant alcohol use and concern that withdrawal may be contributing to his symptoms.  He has no tremulousness, hallucinations, or other symptomatology to suggest alcohol withdrawal at this time.  Will admit for ongoing rate/rhythm control, echocardiogram to evaluate for possible valvular disease, inpatient cardiology consultation to discuss risks, benefits of anticoagulation and rate/rhythm control agents for home symptom control. Findings and plan discussed with the patient, and he is agreeable to admission at this time.   .   Documents reviewed:  Emergency department nurses' notes.   Orders  Launch Order Profile (Selected)   Inpatient  Orders  Ordered  Capacity Management Bed Request: 04/15/22 10:03:19 CDT, Telemetry with Monitor  Cardiac Monitorin/15/22 8:04:00 CDT, Constant Order  Consult to Cardiology Service On Call: 04/15/22 9:01:00 CDT, new onset AF, Justin HAQUE, [place]  Echocardiogram Complete Adult: 04/15/22 9:01:00 CDT, Atrial Fibrillation, Stop date 04/15/22 9:01:00 CDT  HT Screenin/15/22 7:50:15 CDT  IVF Lactated Ringers LR Bolus 1000ml 1,000 mL: 1,000 mL, 1,000 mL, IV, Bolus, start date 04/15/22 8:42:00 CDT, 2.38, m2  Lovenox: 110 mg, form: Injection, Subcutaneous, Once, first dose 04/15/22 8:57:00 CDT, stop date 04/15/22 8:57:00 CDT, STAT  Place in Outpatient Observation: 04/15/22 10:03:00 CDT, Telemetry with Monitor Martine HAQUE, Faustino OLIVAREZ, No, new onset AF RVR  Completed  Automated Diff: STAT collect, 04/15/22 8:15:00 CDT, Blood, Collected, Once, Stop date 04/15/22 8:15:00 CDT, Lab Collect, Print Label By Order Location, 04/15/22 8:04:00 CDT  CBC w/ Auto Diff: STAT collect, 04/15/22 8:15:53 CDT, BLOOD, Collected, Stop date 04/15/22 8:15:00 CDT, Lab Collect  CMP: STAT collect, 04/15/22 8:15:53 CDT, BLOOD, Collected, Stop date 04/15/22 8:15:00 CDT, Lab Collect  Cardizem 5mg/ml intravenous inj: 25 mg, form: Injection, IV Push, Once, first dose 04/15/22 8:56:00 CDT, stop date 04/15/22 8:56:00 CDT, STAT, Admin. Over 2 min.  D-Dimer: STAT collect, 04/15/22 8:15:53 CDT, BLOOD, Collected, Stop date 04/15/22 8:15:00 CDT, Lab Collect  Estimated Glomerular Filtration Rate: STAT collect, 04/15/22 8:15:00 CDT, Blood, Collected, Once, Stop date 04/15/22 8:15:00 CDT, Lab Collect, Print Label By Order Location, 04/15/22 8:04:00 CDT  Magnesium Level: STAT collect, 04/15/22 8:15:53 CDT, BLOOD, Collected, Stop date 04/15/22 8:04:00 CDT, Lab Collect  TSH: STAT collect, 04/15/22 8:15:53 CDT, BLOOD, Collected, Stop date 04/15/22 8:15:00 CDT, Lab Collect  Troponin-I: Stat collect, 04/15/22 8:56:00 CDT, Blood, Stop date 04/15/22 8:57:00 CDT, Lab Collect,  Print Label By Order Location, 04/15/22 8:56:00 CDT  metoprolol tartrate 1 mg/mL injectable solution: 5 mg, form: Injection, IV, q5min PRN for tachycardia, Order duration: 3 dose(s), first dose 04/15/22 8:06:00 CDT, stop date Limited # of times, STAT, hold for SBP < 100  Deleted  diltiazem additive 125 mg + Sodium Chloride 0.9% 100ml 100 mL: 125 mL, 100 mL, IV, titrate, start date 04/15/22 9:07:00 CDT, hold if HR < 60, 2.38, m2.   Electrocardiogram:  Time 4/15/2022 07:57:00, rate 138, EP Interp, The Rhythm is atrial fibrillation and rapid ventricular response.  , The Axis is rightward.  , STT segments Non specific changes, no STEMI.    Electrocardiogram:  Time 4/15/2022 09:17:00, rate 65, EP Interp, The Rhythm is atrial fibrillation.  , The Axis is rightward.  , no STEMI.    Results review:  Lab results : Lab View   4/15/2022 8:49 CDT       Est Creat Clearance Ser   125.38 mL/min    4/15/2022 8:15 CDT       Sodium Lvl                139 mmol/L                             Potassium Lvl             4.9 mmol/L                             Chloride                  106 mmol/L                             CO2                       25 mmol/L                             Calcium Lvl               10.8 mg/dL  HI                             Magnesium Lvl             2.30 mg/dL                             Glucose Lvl               95 mg/dL                             BUN                       11.1 mg/dL                             Creatinine                1.00 mg/dL                             eGFR-AA                   >60  NA                             eGFR-ONDINA                  >60 mL/min/1.73 m2  NA                             Bili Total                0.8 mg/dL                             Bili Direct               0.3 mg/dL                             Bili Indirect             0.50 mg/dL                             AST                       26 unit/L                             ALT                       30 unit/L                              Alk Phos                  69 unit/L                             Total Protein             8.4 gm/dL  HI                             Albumin Lvl               4.7 gm/dL                             Globulin                  3.7 gm/dL  HI                             A/G Ratio                 1.3 ratio                             Troponin-I                <0.010 ng/mL                             TSH                       1.8296 uIU/mL                             WBC                       8.7 x10(3)/mcL                             RBC                       6.15 x10(6)/mcL  HI                             Hgb                       18.8 gm/dL  HI                             Hct                       52.2 %  HI                             Platelet                  312 x10(3)/mcL                             MCV                       84.9 fL                             MCH                       30.6 pg                             MCHC                      36.0 gm/dL                             RDW                       11.9 %                             MPV                       9.1 fL  LOW                             Abs Neut                  5.12 x10(3)/mcL                             Neutro Auto               58 %  NA                             Lymph Auto                29 %  NA                             Mono Auto                 9 %  NA                             Eos Auto                  2 %  NA                             Abs Eos                   0.1 x10(3)/mcL                             Basophil Auto             1 %  NA                             Abs Neutro                5.12 x10(3)/mcL                             Abs Lymph                 2.5 x10(3)/mcL                             Abs Mono                  0.8 x10(3)/mcL                             Abs Baso                  0.1 x10(3)/mcL                             D-Dimer                   <0.27 mcg/mL FEU  , Interpretation Labs unremarkable.        Reexamination/ Reevaluation   Course: improving.      Procedure   Critical care note   Total time: 40 minutes spent engaged in work directly related to patient care and/ or available for direct patient care.   Critical condition(s) addressed for impending deterioration include: cardiovascular.   Associated risk factors: dysrhythmia.   Management: bedside assessment, supervision of care, Interpretation (electrocardiogram, blood pressure), Interventions hemodynamic management, Case review medical specialist (cardiology).   Performed by: self.      Impression and Plan   Diagnosis   Atrial fibrillation with RVR ([id number] I48.91)   New onset a-fib ([id number] I48.91)      Calls-Consults   -  4/15/2022 08:58:00 , Saúl DENT, Magi, cardiology, will follow in consultation.    Plan   Condition: Stable.    Disposition: Admit time  4/15/2022 10:05:00, Martine HAQUE, Faustino OLIVAREZ, case discussed with Finn Celestin NP.    Counseled: Patient, Regarding diagnosis, Regarding diagnostic results, Regarding treatment plan, Patient indicated understanding of instructions.    Notes: IMartha, acted solely as a scribe for and in the presence of Dr. Iniguez who performed this service., IBobbi, have independently performed the history, physical, medical decision making and procedures as documented above and agree with the scribe's documentation. .

## 2022-05-14 NOTE — CONSULTS
Patient:   RAHUL MONTIEL            MRN: [id number]            FIN: [id number]               Age:   31 years     Sex:  Male     :  1990   Associated Diagnoses:   None   Author:   Gilmer HAQUE, Rob WEISS      History of Present Illness   Mr. Montiel is a 30y/o WM, followed by Dr. Hu- last seen , with PMHx significant for ADHD and mild MR who presented to ED with c/o Intermittent palpitations since Wednesday with associated chest pain shortness of breath and lightheadedness. EKG revealing New onset Afib, RVR. /106, RR 20, 100% on room air.  He was treated with Lopressor x3 Cardizem bolus/drip and IV fluids    Pt does admit to recent increase in Etoh and energy drink use.  Pt does report wife says he has been snoring and occasional stops breathing during sleep.    Pt has spontaneously converted back to SR.      PMH: ADHD, Mild MR/Trace TR, palpitations  PSH:  Family History:  Social History:    Previous Diagnostics:   TTE 2020: LV normal size.  Global LV systolic function is normal.  LVEF 65%.  1+ MR.  PASP 18 mmHg.  Left atrial diameter is decreased in size.  Right atrium is normal in size.  RV systolic function is normal.  IVC is normal.  Aortic valve is tricuspid with normal morphology and cusp excursion.  Trace TR  TTE 2020: Stress EKG is normal.  Exercise treadmill test MPHR 88%.  Functional capacity is good 10.1 METS.  Heart rate recovery is normal.  Study quality is good. No ST segment changes consistent with ischemia      Health Status   Current medications:    Medications (2) Active  Scheduled: (1)  enoxaparin 150 mg/mL Inj  110 mg 0.73 mL, Subcutaneous, Once  Continuous: (1)  lactated ringers 1,000 mL  1,000 mL, IV  PRN: (0)        Physical Examination      Vital Signs (last 24 hrs)_____  Last Charted___________  Temp Oral     36.7 DegC  (APR 15 07:53)  Heart Rate Peripheral   H 143bpm  (APR 15 08:15)  Resp Rate         19 br/min  (APR 15 08:15)  SBP      H 146mmHg  (APR 15  08:36)  DBP      H 108mmHg  (APR 15 08:36)  SpO2      97 %  (APR 15 08:15)        Review / Management   Results review:     Labs (Last four charted values)  WBC                  8.7 (APR 15)   Hgb                  H 18.8 (APR 15)   Hct                  H 52.2 (APR 15)   Plt                  312 (APR 15)   Na                   139 (APR 15)   K                    4.9 (APR 15)   CO2                  25 (APR 15)   Cl                   106 (APR 15)   Cr                   1.00 (APR 15)   BUN                  11.1 (APR 15)   Glucose Random       95 (APR 15) .       Impression and Plan   New Onset Afib, RVR - converted back to SR  --CHADS VASC score- 0  HTN  ADHD    Plan:  Advised cessation of Etoh and energy drinks  Arrange outpatient sleep study to evaluate for KERRY  Start Cardizem  mg daily  f/u w/ me in 2-3 weeks   OK to MI home

## 2022-05-20 NOTE — HISTORICAL [facility] CERNER
This is a historical note converted from [employer]. Formatting and pictures may have been removed.  Please reference [employer] for original formatting and attached multimedia. Chief Complaint  c/o intermittent palpitations since wednesday with chest pain and sob since this morning. denies cardiac hx. episode of light headedness while at work this morning  History of Present Illness  CC: chest pain  ?  HPI:  32 yo male with pmhx of adhd and mitral regurgitation presents to the ED with new onset of dyspnea and palpitations starting 2 days ago.? He has experienced similar symptoms in the past but never this consistent.? Associated with some vertigo as well.? EKG revealed new atrial fibrillation with RVR. Blood pressure stable.? Cardiology was consulted.? He was started on cardizem bolus/drip and converted back to normal sinus rhythm.? Currently asymptomatic and cardiology has cleared for discharge.? Recommending alcohol cessation and limit energy drinks.? Needs outpatient sleep study and will follow up in 3 weeks in clinic.? DC with cardizem cd 120 mg daily  Review of Systems  Except as documented, all other systems reviewed and negative.  Physical Exam  Vitals & Measurements  T:?36.7? ?C (Oral)? HR:?66(Peripheral)? HR:?66(Monitored)? RR:?21? BP:?135/81? SpO2:?96%? WT:?113.5?kg?  General: No acute distress, Awake, Alert,Oriented x3  HEENT: PERRL, EOMI, no scleral icterus, OP clear  Respiratory:CTA bilateral, no wheezes, rales, or rhonchi  Cardiovascular:Regular rate and rhythm, no murmurs, rubs or gallops, +s1/s2,_  Gastrointestinal: soft,nontender, nondistended, +BS  Musculoskeletal: Normal range of motion, no pertinent deformity  Integumentary: clean, warm, dry, intact  Neurologic: CN 2 - 12 grossly intact, no acute deficit noted  ?  Assessment/Plan:  Afib with RVR  ADHD  ?   Cardizem 120mg dialy  Follow up in cardiology clinic  Outpatient sleep study   Problem List/Past Medical History  Ongoing  Anxiety  Obesity  TMJ  syndrome  Historical  Attention deficit disorder without hyperactivity  Murmur  Traumatic dislocation of thumb  Procedure/Surgical History  Tonsillectomy   Medications  Inpatient  IVF Lactated Ringers LR Bolus 1000ml 1,000 mL, 1000 mL, IV  Home  No active home medications  Allergies  No Known Medication Allergies  Social History  Abuse/Neglect  No, 04/15/2022  No, 10/19/2021  Alcohol  Never, 03/29/2018  Employment/School  Employed, Work/School description: [employer] 03/29/2018  Substance Use  Never, 03/29/2018  Tobacco  Former smoker, quit more than 30 days ago, N/A, 04/15/2022  Former smoker, quit more than 30 days ago, Cigarettes, No, Household tobacco concerns: No. Smokeless Tobacco Use: Never., 10/19/2021  Family History  Alzheimers disease: Grandfather.  Diabetes mellitus type 2: Aunt.  Disorder of immune system: Mother.  Hypertension.: Father and Sister.  Hypothyroidism.: Mother.  Primary malignant neoplasm of breast: Aunt and Aunt.  Lab Results  Labs Last 24 Hours?  ?Chemistry? Hematology/Coagulation?   Sodium Lvl: 139 mmol/L (04/15/22 08:15:00) WBC: 8.7 x10(3)/mcL (04/15/22 08:15:00)   Potassium Lvl: 4.9 mmol/L (04/15/22 08:15:00) RBC:?6.15 x10(6)/mcL?High (04/15/22 08:15:00)   Chloride: 106 mmol/L (04/15/22 08:15:00) Hgb:?18.8 gm/dL?High (04/15/22 08:15:00)   CO2: 25 mmol/L (04/15/22 08:15:00) Hct:?52.2 %?High (04/15/22 08:15:00)   Calcium Lvl:?10.8 mg/dL?High (04/15/22 08:15:00) Platelet: 312 x10(3)/mcL (04/15/22 08:15:00)   Magnesium Lvl: 2.3 mg/dL (04/15/22 08:15:00) MCV: 84.9 fL (04/15/22 08:15:00)   Glucose Lvl: 95 mg/dL (04/15/22 08:15:00) MCH: 30.6 pg (04/15/22 08:15:00)   BUN: 11.1 mg/dL (04/15/22 08:15:00) MCHC: 36 gm/dL (04/15/22 08:15:00)   Creatinine: 1 mg/dL (04/15/22 08:15:00) RDW: 11.9 % (04/15/22 08:15:00)   Est Creat Clearance Ser: 125.38 mL/min (04/15/22 08:49:52) MPV:?9.1 fL?Low (04/15/22 08:15:00)   eGFR-AA: >60 (04/15/22 08:15:00) Abs Neut: 5.12 x10(3)/mcL (04/15/22  08:15:00)   eGFR-ONDINA: >60 (04/15/22 08:15:00) Neutro Auto: 58 % (04/15/22 08:15:00)   Bili Total: 0.8 mg/dL (04/15/22 08:15:00) Lymph Auto: 29 % (04/15/22 08:15:00)   Bili Direct: 0.3 mg/dL (04/15/22 08:15:00) Mono Auto: 9 % (04/15/22 08:15:00)   Bili Indirect: 0.5 mg/dL (04/15/22 08:15:00) Eos Auto: 2 % (04/15/22 08:15:00)   AST: 26 unit/L (04/15/22 08:15:00) Abs Eos: 0.1 x10(3)/mcL (04/15/22 08:15:00)   ALT: 30 unit/L (04/15/22 08:15:00) Basophil Auto: 1 % (04/15/22 08:15:00)   Alk Phos: 69 unit/L (04/15/22 08:15:00) Abs Neutro: 5.12 x10(3)/mcL (04/15/22 08:15:00)   Total Protein:?8.4 gm/dL?High (04/15/22 08:15:00) Abs Lymph: 2.5 x10(3)/mcL (04/15/22 08:15:00)   Albumin Lvl: 4.7 gm/dL (04/15/22 08:15:00) Abs Mono: 0.8 x10(3)/mcL (04/15/22 08:15:00)   Globulin:?3.7 gm/dL?High (04/15/22 08:15:00) Abs Baso: 0.1 x10(3)/mcL (04/15/22 08:15:00)   A/G Ratio: 1.3 ratio (04/15/22 08:15:00) D-Dimer: <0.27 (04/15/22 08:15:00)   Troponin-I: <0.010 (04/15/22 08:15:00)    TSH: 1.8296 uIU/mL (04/15/22 08:15:00)

## 2022-06-09 ENCOUNTER — ANESTHESIA (OUTPATIENT)
Dept: CARDIOLOGY | Facility: HOSPITAL | Age: 32
End: 2022-06-09
Payer: COMMERCIAL

## 2022-06-09 ENCOUNTER — ANESTHESIA EVENT (OUTPATIENT)
Dept: CARDIOLOGY | Facility: HOSPITAL | Age: 32
End: 2022-06-09
Payer: COMMERCIAL

## 2022-06-09 ENCOUNTER — HOSPITAL ENCOUNTER (OUTPATIENT)
Facility: HOSPITAL | Age: 32
Discharge: HOME | End: 2022-06-09
Attending: INTERNAL MEDICINE | Admitting: INTERNAL MEDICINE
Payer: COMMERCIAL

## 2022-06-09 VITALS
TEMPERATURE: 98 F | WEIGHT: 255.31 LBS | OXYGEN SATURATION: 98 % | HEART RATE: 91 BPM | DIASTOLIC BLOOD PRESSURE: 82 MMHG | BODY MASS INDEX: 32.77 KG/M2 | RESPIRATION RATE: 15 BRPM | HEIGHT: 74 IN | SYSTOLIC BLOOD PRESSURE: 131 MMHG

## 2022-06-09 DIAGNOSIS — I48.3 TYPICAL ATRIAL FLUTTER: ICD-10-CM

## 2022-06-09 DIAGNOSIS — Z01.818 PREOP TESTING: ICD-10-CM

## 2022-06-09 DIAGNOSIS — Z98.890 H/O CARDIAC RADIOFREQUENCY ABLATION: ICD-10-CM

## 2022-06-09 PROCEDURE — C1731 CATH, EP, 20 OR MORE ELEC: HCPCS | Performed by: INTERNAL MEDICINE

## 2022-06-09 PROCEDURE — 93005 ELECTROCARDIOGRAM TRACING: CPT

## 2022-06-09 PROCEDURE — 37000009 HC ANESTHESIA EA ADD 15 MINS: Performed by: INTERNAL MEDICINE

## 2022-06-09 PROCEDURE — C1732 CATH, EP, DIAG/ABL, 3D/VECT: HCPCS | Performed by: INTERNAL MEDICINE

## 2022-06-09 PROCEDURE — 93653 COMPRE EP EVAL TX SVT: CPT | Performed by: INTERNAL MEDICINE

## 2022-06-09 PROCEDURE — 37000008 HC ANESTHESIA 1ST 15 MINUTES: Performed by: INTERNAL MEDICINE

## 2022-06-09 PROCEDURE — 63600175 PHARM REV CODE 636 W HCPCS: Performed by: INTERNAL MEDICINE

## 2022-06-09 PROCEDURE — 25000003 PHARM REV CODE 250: Performed by: NURSE ANESTHETIST, CERTIFIED REGISTERED

## 2022-06-09 PROCEDURE — 63600175 PHARM REV CODE 636 W HCPCS: Performed by: NURSE ANESTHETIST, CERTIFIED REGISTERED

## 2022-06-09 PROCEDURE — C1894 INTRO/SHEATH, NON-LASER: HCPCS | Performed by: INTERNAL MEDICINE

## 2022-06-09 PROCEDURE — 27201423 OPTIME MED/SURG SUP & DEVICES STERILE SUPPLY: Performed by: INTERNAL MEDICINE

## 2022-06-09 PROCEDURE — C1730 CATH, EP, 19 OR FEW ELECT: HCPCS | Performed by: INTERNAL MEDICINE

## 2022-06-09 PROCEDURE — 25000003 PHARM REV CODE 250: Performed by: INTERNAL MEDICINE

## 2022-06-09 RX ORDER — ONDANSETRON HYDROCHLORIDE 2 MG/ML
INJECTION, SOLUTION INTRAMUSCULAR; INTRAVENOUS
Status: DISCONTINUED | OUTPATIENT
Start: 2022-06-09 | End: 2022-06-09

## 2022-06-09 RX ORDER — FENTANYL CITRATE 50 UG/ML
INJECTION, SOLUTION INTRAMUSCULAR; INTRAVENOUS
Status: DISCONTINUED | OUTPATIENT
Start: 2022-06-09 | End: 2022-06-09

## 2022-06-09 RX ORDER — LIDOCAINE HYDROCHLORIDE 20 MG/ML
INJECTION, SOLUTION EPIDURAL; INFILTRATION; INTRACAUDAL; PERINEURAL
Status: DISCONTINUED | OUTPATIENT
Start: 2022-06-09 | End: 2022-06-09

## 2022-06-09 RX ORDER — LIDOCAINE HYDROCHLORIDE 10 MG/ML
1 INJECTION, SOLUTION EPIDURAL; INFILTRATION; INTRACAUDAL; PERINEURAL ONCE
Status: CANCELLED | OUTPATIENT
Start: 2022-06-09 | End: 2022-06-09

## 2022-06-09 RX ORDER — HYDROCODONE BITARTRATE AND ACETAMINOPHEN 5; 325 MG/1; MG/1
1 TABLET ORAL EVERY 4 HOURS PRN
Status: DISCONTINUED | OUTPATIENT
Start: 2022-06-09 | End: 2022-06-10 | Stop reason: HOSPADM

## 2022-06-09 RX ORDER — DILTIAZEM HYDROCHLORIDE 360 MG/1
360 CAPSULE, EXTENDED RELEASE ORAL DAILY
COMMUNITY

## 2022-06-09 RX ORDER — SODIUM CHLORIDE, SODIUM GLUCONATE, SODIUM ACETATE, POTASSIUM CHLORIDE AND MAGNESIUM CHLORIDE 30; 37; 368; 526; 502 MG/100ML; MG/100ML; MG/100ML; MG/100ML; MG/100ML
1000 INJECTION, SOLUTION INTRAVENOUS CONTINUOUS
Status: CANCELLED | OUTPATIENT
Start: 2022-06-09 | End: 2022-07-09

## 2022-06-09 RX ORDER — LIDOCAINE HYDROCHLORIDE 20 MG/ML
INJECTION, SOLUTION INFILTRATION; PERINEURAL
Status: DISCONTINUED | OUTPATIENT
Start: 2022-06-09 | End: 2022-06-10 | Stop reason: HOSPADM

## 2022-06-09 RX ORDER — DEXAMETHASONE SODIUM PHOSPHATE 4 MG/ML
INJECTION, SOLUTION INTRA-ARTICULAR; INTRALESIONAL; INTRAMUSCULAR; INTRAVENOUS; SOFT TISSUE
Status: DISCONTINUED | OUTPATIENT
Start: 2022-06-09 | End: 2022-06-09

## 2022-06-09 RX ORDER — DIPHENHYDRAMINE HYDROCHLORIDE 50 MG/ML
25 INJECTION INTRAMUSCULAR; INTRAVENOUS EVERY 6 HOURS PRN
Status: DISCONTINUED | OUTPATIENT
Start: 2022-06-09 | End: 2022-06-09

## 2022-06-09 RX ORDER — HYDROMORPHONE HYDROCHLORIDE 2 MG/ML
0.2 INJECTION, SOLUTION INTRAMUSCULAR; INTRAVENOUS; SUBCUTANEOUS EVERY 5 MIN PRN
Status: DISCONTINUED | OUTPATIENT
Start: 2022-06-09 | End: 2022-06-09

## 2022-06-09 RX ORDER — ROCURONIUM BROMIDE 10 MG/ML
INJECTION, SOLUTION INTRAVENOUS
Status: DISCONTINUED | OUTPATIENT
Start: 2022-06-09 | End: 2022-06-09

## 2022-06-09 RX ORDER — ADENOSINE 3 MG/ML
INJECTION, SOLUTION INTRAVENOUS
Status: DISCONTINUED | OUTPATIENT
Start: 2022-06-09 | End: 2022-06-10 | Stop reason: HOSPADM

## 2022-06-09 RX ORDER — PROPOFOL 10 MG/ML
VIAL (ML) INTRAVENOUS
Status: DISCONTINUED | OUTPATIENT
Start: 2022-06-09 | End: 2022-06-09

## 2022-06-09 RX ORDER — MIDAZOLAM HYDROCHLORIDE 1 MG/ML
2 INJECTION INTRAMUSCULAR; INTRAVENOUS ONCE AS NEEDED
Status: CANCELLED | OUTPATIENT
Start: 2022-06-09 | End: 2033-11-04

## 2022-06-09 RX ORDER — PHENYLEPHRINE HYDROCHLORIDE 10 MG/ML
INJECTION INTRAVENOUS
Status: DISCONTINUED | OUTPATIENT
Start: 2022-06-09 | End: 2022-06-09

## 2022-06-09 RX ORDER — ONDANSETRON 2 MG/ML
4 INJECTION INTRAMUSCULAR; INTRAVENOUS DAILY PRN
Status: DISCONTINUED | OUTPATIENT
Start: 2022-06-09 | End: 2022-06-09

## 2022-06-09 RX ADMIN — SODIUM CHLORIDE, POTASSIUM CHLORIDE, SODIUM LACTATE AND CALCIUM CHLORIDE: 600; 310; 30; 20 INJECTION, SOLUTION INTRAVENOUS at 07:06

## 2022-06-09 RX ADMIN — ROCURONIUM BROMIDE 50 MG: 10 SOLUTION INTRAVENOUS at 08:06

## 2022-06-09 RX ADMIN — LIDOCAINE HYDROCHLORIDE 80 MG: 20 INJECTION, SOLUTION EPIDURAL; INFILTRATION; INTRACAUDAL; PERINEURAL at 08:06

## 2022-06-09 RX ADMIN — DEXAMETHASONE SODIUM PHOSPHATE 4 MG: 4 INJECTION, SOLUTION INTRA-ARTICULAR; INTRALESIONAL; INTRAMUSCULAR; INTRAVENOUS; SOFT TISSUE at 08:06

## 2022-06-09 RX ADMIN — SUGAMMADEX 200 MG: 100 INJECTION, SOLUTION INTRAVENOUS at 09:06

## 2022-06-09 RX ADMIN — FENTANYL CITRATE 100 MCG: 50 INJECTION, SOLUTION INTRAMUSCULAR; INTRAVENOUS at 08:06

## 2022-06-09 RX ADMIN — PROPOFOL 250 MG: 10 INJECTION, EMULSION INTRAVENOUS at 08:06

## 2022-06-09 RX ADMIN — ONDANSETRON 4 MG: 2 INJECTION INTRAMUSCULAR; INTRAVENOUS at 09:06

## 2022-06-09 RX ADMIN — PHENYLEPHRINE HYDROCHLORIDE 100 MCG: 10 INJECTION INTRAVENOUS at 08:06

## 2022-06-09 NOTE — Clinical Note
The sheath was removed from the right femoral vein retrograde. Right femoral vein 7fr sheath removed.; manual pressure.

## 2022-06-09 NOTE — Clinical Note
The sheath was removed from the right femoral vein. Right femoral 9fr sheath removed; manual pressure held.

## 2022-06-09 NOTE — TRANSFER OF CARE
"Anesthesia Transfer of Care Note    Patient: Scott Montiel    Procedure(s) Performed: Procedure(s):  Ablation, Atrial Flutter, Typical    Patient location: PACU    Anesthesia Type: general    Transport from OR: Transported from OR on room air with adequate spontaneous ventilation    Post pain: adequate analgesia    Post assessment: no apparent anesthetic complications    Post vital signs: stable    Level of consciousness: awake    Nausea/Vomiting: no nausea/vomiting    Complications: none    Transfer of care protocol was followed      Last vitals:   Visit Vitals  BP (!) 138/93   Pulse 102   Temp 36.6 °C (97.9 °F)   Resp 16   Ht 6' 2" (1.88 m)   Wt 115.8 kg (255 lb 4.7 oz)   SpO2 98%   BMI 32.78 kg/m²     "

## 2022-06-09 NOTE — DISCHARGE SUMMARY
[facility] - Cath Lab Services  Discharge Note  Short Stay    Procedure(s) (LRB):  ABLATION, ATRIAL FLUTTER, ATYPICAL (N/A)    OUTCOME: Patient tolerated treatment/procedure well without complication and is now ready for discharge.    DISPOSITION: Home or Self Care    FINAL DIAGNOSIS:  <principal problem not specified>    FOLLOWUP: In clinic    DISCHARGE INSTRUCTIONS:    Discharge Procedure Orders   Diet Cardiac     Lifting restrictions   Order Comments: Do not lift > 15 pounds.     Remove dressing in 24 hours     Notify your health care provider if you experience any of the following:  temperature >100.4     Notify your health care provider if you experience any of the following:  persistent nausea and vomiting or diarrhea     Notify your health care provider if you experience any of the following:  severe uncontrolled pain     Notify your health care provider if you experience any of the following:  redness, tenderness, or signs of infection (pain, swelling, redness, odor or green/yellow discharge around incision site)        TIME SPENT ON DISCHARGE: 10 minutes

## 2022-06-09 NOTE — ANESTHESIA PREPROCEDURE EVALUATION
06/09/2022  Scott Montiel is a 31 y.o., male with new onset A flutter/ A fib for ablation today.  He has been having occaisional palpitations and SOB.  Other PMH as outlined here.  He is in good spirits, and did well with previous ablation attempt three weeks ago.      Pre-op Assessment    I have reviewed the Patient Summary Reports.     I have reviewed the Nursing Notes. I have reviewed the NPO Status.   I have reviewed the Medications.     Review of Systems  Anesthesia Hx:  No problems with previous Anesthesia  Denies Family Hx of Anesthesia complications.   Denies Personal Hx of Anesthesia complications.   Cardiovascular:   Exercise tolerance: good Hypertension Dysrhythmias atrial fibrillation ROBBINS    Pulmonary:   Asthma Shortness of breath    Neurological:   Headaches        Physical Exam  General: Well nourished, Cooperative, Alert and Oriented    Airway:  Mallampati: II   Mouth Opening: Normal  TM Distance: Normal  Tongue: Large  Neck ROM: Normal ROM  Large beard  Dental:  Intact    Chest/Lungs:  Clear to auscultation, Normal Respiratory Rate    Heart:  Rate: Normal  Rhythm: Regular Rhythm        Anesthesia Plan  Type of Anesthesia, risks & benefits discussed:    Anesthesia Type: Gen ETT  Intra-op Monitoring Plan: Standard ASA Monitors and Art Line  Post Op Pain Control Plan: multimodal analgesia and IV/PO Opioids PRN  Induction:  IV  Airway Plan: Direct, Post-Induction  Informed Consent: Informed consent signed with the Patient and all parties understand the risks and agree with anesthesia plan.  All questions answered.   ASA Score: 3  Day of Surgery Review of History & Physical: H&P Update referred to the surgeon/provider.    Ready For Surgery From Anesthesia Perspective.     .

## 2022-06-09 NOTE — Clinical Note
The sheath was exchanged in the right femoral vein. Right femoral 8.5fr SRO sheath exchanged for 9fr sheath.

## 2022-06-09 NOTE — Clinical Note
The sheath was removed from the right femoral vein retrograde. Right femoral 7fr sheath removed; manual pressure applied.

## 2022-06-09 NOTE — ANESTHESIA PROCEDURE NOTES
Intubation    Date/Time: 6/9/2022 8:12 AM  Performed by: Shorty Butler CRNA  Authorized by: Mckinley Parrish MD     Intubation:     Induction:  Intravenous    Intubated:  Postinduction    Attempts:  1    Attempted By:  CRNA    Method of Intubation:  Direct    Blade:  Capps 2    Laryngeal View Grade: Grade I - full view of cords      Difficult Airway Encountered?: No      Complications:  None    Airway Device:  Oral endotracheal tube    Airway Device Size:  7.5    Style/Cuff Inflation:  Cuffed (inflated to minimal occlusive pressure)    Tube secured:  23    Secured at:  The lips (trach. cuff 26 cmh20)    Placement Verified By:  Capnometry    Complicating Factors:  None    Findings Post-Intubation:  BS equal bilateral and atraumatic/condition of teeth unchanged

## 2022-06-10 ENCOUNTER — PATIENT MESSAGE (OUTPATIENT)
Dept: PRIMARY CARE CLINIC | Facility: CLINIC | Age: 32
End: 2022-06-10
Payer: COMMERCIAL

## 2022-06-13 ENCOUNTER — PATIENT MESSAGE (OUTPATIENT)
Dept: ADMINISTRATIVE | Facility: OTHER | Age: 32
End: 2022-06-13
Payer: COMMERCIAL

## 2022-06-13 NOTE — ANESTHESIA POSTPROCEDURE EVALUATION
Anesthesia Post Evaluation    Patient: Scott Montiel    Procedure(s) Performed: Procedure(s):  Ablation, Atrial Flutter, Typical    Final Anesthesia Type: general      Patient location during evaluation: PACU  Patient participation: Yes- Able to Participate  Level of consciousness: awake and alert  Post-procedure vital signs: reviewed and stable  Pain management: adequate  Airway patency: patent      Anesthetic complications: no      Cardiovascular status: blood pressure returned to baseline  Respiratory status: unassisted  Hydration status: euvolemic  Follow-up not needed.          Vitals Value Taken Time   /82 06/09/22 1247   Temp 36.6 °C (97.9 °F) 06/09/22 0948   Pulse 95 06/09/22 1256   Resp 18 06/09/22 1256   SpO2 97 % 06/09/22 1256   Vitals shown include unvalidated device data.      Event Time   Out of Recovery 06/09/2022 10:25:00         Pain/Von Score: No data recorded

## 2022-10-17 ENCOUNTER — PATIENT MESSAGE (OUTPATIENT)
Dept: PRIMARY CARE CLINIC | Facility: CLINIC | Age: 32
End: 2022-10-17
Payer: COMMERCIAL

## 2022-10-20 ENCOUNTER — OFFICE VISIT (OUTPATIENT)
Dept: PRIMARY CARE CLINIC | Facility: CLINIC | Age: 32
End: 2022-10-20
Payer: COMMERCIAL

## 2022-10-20 VITALS
RESPIRATION RATE: 19 BRPM | WEIGHT: 255 LBS | DIASTOLIC BLOOD PRESSURE: 85 MMHG | BODY MASS INDEX: 32.73 KG/M2 | HEART RATE: 84 BPM | OXYGEN SATURATION: 97 % | HEIGHT: 74 IN | TEMPERATURE: 98 F | SYSTOLIC BLOOD PRESSURE: 139 MMHG

## 2022-10-20 DIAGNOSIS — I48.19 PERSISTENT ATRIAL FIBRILLATION: ICD-10-CM

## 2022-10-20 DIAGNOSIS — I10 PRIMARY HYPERTENSION: Primary | ICD-10-CM

## 2022-10-20 PROCEDURE — 3079F DIAST BP 80-89 MM HG: CPT | Mod: CPTII,,, | Performed by: INTERNAL MEDICINE

## 2022-10-20 PROCEDURE — 3075F SYST BP GE 130 - 139MM HG: CPT | Mod: CPTII,,, | Performed by: INTERNAL MEDICINE

## 2022-10-20 PROCEDURE — 99212 OFFICE O/P EST SF 10 MIN: CPT | Mod: ,,, | Performed by: INTERNAL MEDICINE

## 2022-10-20 PROCEDURE — 1160F RVW MEDS BY RX/DR IN RCRD: CPT | Mod: CPTII,,, | Performed by: INTERNAL MEDICINE

## 2022-10-20 PROCEDURE — 1159F PR MEDICATION LIST DOCUMENTED IN MEDICAL RECORD: ICD-10-PCS | Mod: CPTII,,, | Performed by: INTERNAL MEDICINE

## 2022-10-20 PROCEDURE — 1160F PR REVIEW ALL MEDS BY PRESCRIBER/CLIN PHARMACIST DOCUMENTED: ICD-10-PCS | Mod: CPTII,,, | Performed by: INTERNAL MEDICINE

## 2022-10-20 PROCEDURE — 99212 PR OFFICE/OUTPT VISIT, EST, LEVL II, 10-19 MIN: ICD-10-PCS | Mod: ,,, | Performed by: INTERNAL MEDICINE

## 2022-10-20 PROCEDURE — 3075F PR MOST RECENT SYSTOLIC BLOOD PRESS GE 130-139MM HG: ICD-10-PCS | Mod: CPTII,,, | Performed by: INTERNAL MEDICINE

## 2022-10-20 PROCEDURE — 1159F MED LIST DOCD IN RCRD: CPT | Mod: CPTII,,, | Performed by: INTERNAL MEDICINE

## 2022-10-20 PROCEDURE — 3079F PR MOST RECENT DIASTOLIC BLOOD PRESSURE 80-89 MM HG: ICD-10-PCS | Mod: CPTII,,, | Performed by: INTERNAL MEDICINE

## 2022-10-20 RX ORDER — FLECAINIDE ACETATE 50 MG/1
50 TABLET ORAL 2 TIMES DAILY
COMMUNITY
Start: 2022-09-14 | End: 2023-04-20 | Stop reason: ALTCHOICE

## 2022-10-20 NOTE — PROGRESS NOTES
Kathryn Russell MD   [address] [facility] [place] [address]     PATIENT NAME: Scott Montiel  : 1990  DATE: 10/20/22  MRN: [id number]      Billing Provider: Kathryn Russell MD  Level of Service:   Patient PCP Information       Provider PCP Type    Kathryn Russell MD General            Reason for Visit / Chief Complaint: 6 months follow up       Update PCP  Update Chief Complaint         History of Present Illness / Problem Focused Workflow     Scott Montiel presents to the clinic with 6 months follow up     Here for follow up, he is doing better but he still has occasional palpitations. He did an ablation but they still see fib. He is back in the gym and so far he's tolerating it. They are not having much sleep deprivation. They have a new baby boy. He is concerned that there has been correlation of low testosterone with atrial fibrillation.       Review of Systems     Review of Systems   Constitutional:         Weight is good   Respiratory: Negative.     Cardiovascular:         Still waiting on second ablation.    Gastrointestinal: Negative.    Endocrine:        Hair growth is good, he has no issues with muscle loss.    Musculoskeletal: Negative.    Skin: Negative.      Medical / Social / Family History     Past Medical History:   Diagnosis Date    Anxiety     Asthma     Atrial fibrillation with RVR     Tension headache     Valvular regurgitation        Past Surgical History:   Procedure Laterality Date    ABLATION OF ARRHYTHMOGENIC FOCUS FOR ATRIAL FIBRILLATION      ABLATION OF ARRHYTHMOGENIC FOCUS FOR ATRIAL FLUTTER  2022    TONSILLECTOMY         Social History  Mr. Montiel      reports that he has quit smoking. He has never used smokeless tobacco. He reports that he does not currently use alcohol. He reports that he does not currently use drugs after having used the following drugs: Marijuana.    Family History  Mr.'s Montiel   family history is not on file.    Medications and Allergies     Medications  Outpatient  Medications Marked as Taking for the 10/20/22 encounter (Office Visit) with Kathryn Russell MD   Medication Sig Dispense Refill    diltiaZEM (CARDIZEM CD) 360 MG 24 hr capsule Take 360 mg by mouth once daily.      ELIQUIS 5 mg Tab Take 5 mg by mouth 2 (two) times daily.      flecainide (TAMBOCOR) 50 MG Tab Take 50 mg by mouth 2 (two) times daily.         Allergies  Review of patient's allergies indicates:  No Known Allergies    Physical Examination     Vitals:    10/20/22 1518   BP: 139/85   Pulse: 84   Resp: 19   Temp: 98.3 °F (36.8 °C)     Physical Exam  Vitals reviewed.   Constitutional:       Appearance: Normal appearance.   HENT:      Head: Normocephalic.   Cardiovascular:      Rate and Rhythm: Normal rate and regular rhythm.      Heart sounds: Normal heart sounds.   Pulmonary:      Effort: Pulmonary effort is normal.      Breath sounds: Normal breath sounds. No wheezing or rales.   Neurological:      Mental Status: He is alert.         Assessment and Plan (including Health Maintenance)      Problem List  Smart Sets  Document Outside HM   :    Plan:     Agree with Dash diet, take cuff to cardiology visit and check it's accuracy, we may need another medication if the readings continue to be high after exercise.   Consider testosterone level with annual lab.     Health Maintenance Due   Topic Date Due    Hepatitis C Screening  Never done    HIV Screening  Never done    COVID-19 Vaccine (4 - Booster) 01/18/2022    Influenza Vaccine (1) 09/01/2022       Problem List Items Addressed This Visit          Cardiac/Vascular    Primary hypertension - Primary     Other Visit Diagnoses       Persistent atrial fibrillation                Health Maintenance Topics with due status: Not Due       Topic Last Completion Date    TETANUS VACCINE 01/11/2021       No future appointments.         Signature:  Kathryn Russell MD  [facility]  [id number] [id number]    Date of encounter: 10/20/22

## 2023-02-24 ENCOUNTER — PATIENT MESSAGE (OUTPATIENT)
Dept: PRIMARY CARE CLINIC | Facility: CLINIC | Age: 33
End: 2023-02-24
Payer: COMMERCIAL

## 2023-04-19 PROBLEM — F41.1 GENERALIZED ANXIETY DISORDER: Status: ACTIVE | Noted: 2022-05-06

## 2023-04-19 NOTE — PROGRESS NOTES
Kathryn Russell MD   [address] [address]     Patient ID: [id number]     Chief Complaint: Annual Exam        HPI:     Scott Montiel is a 32 y.o. male here today for annual Wellness. He has talked to job about moving on and they gave him more help and a promotion so he's in a better place. He's working with a therapist and that's helping. No other complaints today. Heart has been doing well, still seeing [facility].       Subjective:     Review of Systems   Constitutional: Negative.    HENT:  Negative for hearing loss.    Eyes:  Negative for discharge.   Respiratory:  Negative for wheezing.    Cardiovascular:  Negative for chest pain and palpitations.        BP at home 120's since second medication added.    Gastrointestinal:  Negative for blood in stool, constipation, diarrhea and vomiting.   Genitourinary:  Negative for hematuria and urgency.   Musculoskeletal:  Negative for neck pain.   Skin: Negative.    Neurological:  Negative for weakness and headaches.   Endo/Heme/Allergies:  Negative for polydipsia.   Psychiatric/Behavioral:          Stress is better, baby doing well.    Answers submitted by the patient for this visit:  Review of Systems Questionnaire (Submitted on 4/20/2023)  activity change: No  unexpected weight change: No  rhinorrhea: No  trouble swallowing: No  visual disturbance: No  chest tightness: No  polyuria: No  difficulty urinating: No  joint swelling: No  arthralgias: No  confusion: No  dysphoric mood: No    Past Medical History:   Diagnosis Date    Anxiety     Asthma     Atrial fibrillation with RVR     Tension headache     Valvular regurgitation         Past Surgical History:   Procedure Laterality Date    ABLATION OF ARRHYTHMOGENIC FOCUS FOR ATRIAL FIBRILLATION      ABLATION OF ARRHYTHMOGENIC FOCUS FOR ATRIAL FLUTTER  06/09/2022    TONSILLECTOMY         History reviewed. No pertinent family history.     Social History     Socioeconomic History    Marital status:    Tobacco  Use    Smoking status: Former    Smokeless tobacco: Never   Substance and Sexual Activity    Alcohol use: Not Currently    Drug use: Not Currently     Types: Marijuana     Social Determinants of Health     Financial Resource Strain: Low Risk     Difficulty of Paying Living Expenses: Not hard at all   Food Insecurity: No Food Insecurity    Worried About Running Out of Food in the Last Year: Never true    Ran Out of Food in the Last Year: Never true   Transportation Needs: No Transportation Needs    Lack of Transportation (Medical): No    Lack of Transportation (Non-Medical): No   Physical Activity: Sufficiently Active    Days of Exercise per Week: 5 days    Minutes of Exercise per Session: 60 min   Stress: No Stress Concern Present    Feeling of Stress : Only a little   Social Connections: Moderately Integrated    Frequency of Communication with Friends and Family: More than three times a week    Frequency of Social Gatherings with Friends and Family: Twice a week    Attends Protestant Services: Never    Active Member of Clubs or Organizations: Yes    Attends Club or Organization Meetings: 1 to 4 times per year    Marital Status:    Housing Stability: Low Risk     Unable to Pay for Housing in the Last Year: No    Number of Places Lived in the Last Year: 1    Unstable Housing in the Last Year: No       Review of patient's allergies indicates:  No Known Allergies    Outpatient Medications Marked as Taking for the 4/20/23 encounter (Office Visit) with Kathryn Russell MD   Medication Sig Dispense Refill    diltiaZEM (CARDIZEM CD) 360 MG 24 hr capsule Take 360 mg by mouth once daily.      lisinopriL (PRINIVIL,ZESTRIL) 5 MG tablet Take 5 mg by mouth.         Patient Care Team:  Kathryn Russell MD as PCP - General (Internal Medicine)  Dimas Lowe as Licensed Professional Counselor  Rob Scherer MD as Consulting Physician (Cardiology)       Objective:     /79 (BP Location: Left arm,  "Patient Position: Sitting, BP Method: Large (Automatic))   Pulse 84   Temp 98.1 °F (36.7 °C) (Oral)   Resp 18   Ht 6' 2" (1.88 m)   Wt 111.6 kg (246 lb)   SpO2 98%   BMI 31.58 kg/m²     Physical Exam  Vitals reviewed.   HENT:      Head: Normocephalic.      Right Ear: Tympanic membrane, ear canal and external ear normal.      Left Ear: Tympanic membrane, ear canal and external ear normal.      Mouth/Throat:      Mouth: Mucous membranes are moist.      Pharynx: No oropharyngeal exudate or posterior oropharyngeal erythema.   Eyes:      Conjunctiva/sclera: Conjunctivae normal.   Neck:      Vascular: No carotid bruit.   Cardiovascular:      Rate and Rhythm: Normal rate and regular rhythm.      Pulses: Normal pulses.   Pulmonary:      Effort: Pulmonary effort is normal.      Breath sounds: Normal breath sounds.   Abdominal:      General: Abdomen is flat.      Tenderness: There is no abdominal tenderness. There is no guarding.   Musculoskeletal:         General: Normal range of motion.      Cervical back: Normal range of motion.   Lymphadenopathy:      Cervical: No cervical adenopathy.   Skin:     General: Skin is warm and dry.   Neurological:      General: No focal deficit present.      Mental Status: He is alert and oriented to person, place, and time.   Psychiatric:         Mood and Affect: Mood normal.         Behavior: Behavior normal.         Thought Content: Thought content normal.         Judgment: Judgment normal.         Assessment/Problems:       ICD-10-CM ICD-9-CM   1. Wellness examination  Z00.00 V70.0   2. Paroxysmal atrial fibrillation  I48.0 427.31   3. Primary hypertension  I10 401.9        Plan:     1. Wellness examination  -     CBC Auto Differential  -     Comprehensive Metabolic Panel  -     Lipid Panel  -     TSH  -     Urinalysis, Reflex to Urine Culture  -     Testosterone    2. Paroxysmal atrial fibrillation  -     TSH    3. Primary hypertension  -     Comprehensive Metabolic Panel  -     " Lipid Panel  -     Urinalysis, Reflex to Urine Culture             No follow-ups on file. In addition to their scheduled follow up, the patient has also been instructed to follow up on as needed basis.     Signature:  Kathryn Russell MD  [facility]  [id number] [id number]

## 2023-04-20 ENCOUNTER — OFFICE VISIT (OUTPATIENT)
Dept: PRIMARY CARE CLINIC | Facility: CLINIC | Age: 33
End: 2023-04-20
Payer: COMMERCIAL

## 2023-04-20 VITALS
DIASTOLIC BLOOD PRESSURE: 79 MMHG | BODY MASS INDEX: 31.57 KG/M2 | SYSTOLIC BLOOD PRESSURE: 123 MMHG | HEIGHT: 74 IN | OXYGEN SATURATION: 98 % | WEIGHT: 246 LBS | TEMPERATURE: 98 F | RESPIRATION RATE: 18 BRPM | HEART RATE: 84 BPM

## 2023-04-20 DIAGNOSIS — I48.0 PAROXYSMAL ATRIAL FIBRILLATION: ICD-10-CM

## 2023-04-20 DIAGNOSIS — Z00.00 WELLNESS EXAMINATION: Primary | ICD-10-CM

## 2023-04-20 DIAGNOSIS — I10 PRIMARY HYPERTENSION: ICD-10-CM

## 2023-04-20 LAB
ALBUMIN SERPL-MCNC: 5.2 G/DL (ref 4.1–5.2)
ALBUMIN/GLOB SERPL: 1.9 {RATIO} (ref 1.2–2.2)
ALP SERPL-CCNC: 81 IU/L (ref 44–121)
ALT SERPL-CCNC: 21 IU/L (ref 0–44)
APPEARANCE UR: CLEAR
AST SERPL-CCNC: 20 IU/L (ref 0–40)
BACTERIA #/AREA URNS HPF: NORMAL /[HPF]
BASOPHILS # BLD AUTO: 0.1 X10E3/UL (ref 0–0.2)
BASOPHILS NFR BLD AUTO: 1 %
BILIRUB SERPL-MCNC: 0.6 MG/DL (ref 0–1.2)
BILIRUB UR QL STRIP: NEGATIVE
BUN SERPL-MCNC: 15 MG/DL (ref 6–20)
BUN/CREAT SERPL: 13 (ref 9–20)
CALCIUM SERPL-MCNC: 9.9 MG/DL (ref 8.7–10.2)
CHLORIDE SERPL-SCNC: 102 MMOL/L (ref 96–106)
CHOLEST SERPL-MCNC: 138 MG/DL (ref 100–199)
CO2 SERPL-SCNC: 23 MMOL/L (ref 20–29)
COLOR UR: YELLOW
CREAT SERPL-MCNC: 1.14 MG/DL (ref 0.76–1.27)
CRYSTALS URNS MICRO: NORMAL
EOSINOPHIL # BLD AUTO: 0.1 X10E3/UL (ref 0–0.4)
EOSINOPHIL NFR BLD AUTO: 1 %
EPI CELLS #/AREA URNS HPF: NORMAL /HPF (ref 0–10)
ERYTHROCYTE [DISTWIDTH] IN BLOOD BY AUTOMATED COUNT: 11.4 % (ref 11.6–15.4)
EST. GFR  (NO RACE VARIABLE): 89 ML/MIN/1.73
GLOBULIN SER CALC-MCNC: 2.8 G/DL (ref 1.5–4.5)
GLUCOSE SERPL-MCNC: 94 MG/DL (ref 70–99)
GLUCOSE UR QL STRIP: NEGATIVE
HCT VFR BLD AUTO: 47.5 % (ref 37.5–51)
HDLC SERPL-MCNC: 49 MG/DL
HGB BLD-MCNC: 16.7 G/DL (ref 13–17.7)
HGB UR QL STRIP: NEGATIVE
IMM GRANULOCYTES NFR BLD AUTO: 0 %
KETONES UR QL STRIP: NEGATIVE
LDLC SERPL CALC-MCNC: 77 MG/DL (ref 0–99)
LEUKOCYTE ESTERASE UR QL STRIP: NEGATIVE
LYMPHOCYTES # BLD AUTO: 1.9 X10E3/UL (ref 0.7–3.1)
LYMPHOCYTES NFR BLD AUTO: 29 %
MCH RBC QN AUTO: 30.1 PG (ref 26.6–33)
MCHC RBC AUTO-ENTMCNC: 35.2 G/DL (ref 31.5–35.7)
MCV RBC AUTO: 86 FL (ref 79–97)
MICRO URNS: NORMAL
MICRO URNS: NORMAL
MONOCYTES # BLD AUTO: 0.5 X10E3/UL (ref 0.1–0.9)
MONOCYTES NFR BLD AUTO: 7 %
NEUTROPHILS # BLD AUTO: 4.2 X10E3/UL (ref 1.4–7)
NEUTROPHILS NFR BLD AUTO: 62 %
NITRITE UR QL STRIP: NEGATIVE
PH UR STRIP: 7 [PH] (ref 5–7.5)
PLATELET # BLD AUTO: 283 X10E3/UL (ref 150–450)
POTASSIUM SERPL-SCNC: 4.4 MMOL/L (ref 3.5–5.2)
PROT SERPL-MCNC: 8 G/DL (ref 6–8.5)
PROT UR QL STRIP: NEGATIVE
RBC # BLD AUTO: 5.55 X10E6/UL (ref 4.14–5.8)
RBC #/AREA URNS HPF: NORMAL /HPF (ref 0–2)
SODIUM SERPL-SCNC: 141 MMOL/L (ref 134–144)
SP GR UR STRIP: 1.01 (ref 1–1.03)
TESTOST SERPL-MCNC: 286 NG/DL (ref 264–916)
TRIGL SERPL-MCNC: 56 MG/DL (ref 0–149)
TSH SERPL DL<=0.005 MIU/L-ACNC: 1.39 UIU/ML (ref 0.45–4.5)
URINALYSIS REFLEX: NORMAL
UROBILINOGEN UR STRIP-MCNC: 0.2 MG/DL (ref 0.2–1)
VLDLC SERPL CALC-MCNC: 12 MG/DL (ref 5–40)
WBC # BLD AUTO: 6.7 X10E3/UL (ref 3.4–10.8)
WBC #/AREA URNS HPF: NORMAL /HPF (ref 0–5)

## 2023-04-20 PROCEDURE — 1159F MED LIST DOCD IN RCRD: CPT | Mod: CPTII,,, | Performed by: INTERNAL MEDICINE

## 2023-04-20 PROCEDURE — 4010F ACE/ARB THERAPY RXD/TAKEN: CPT | Mod: CPTII,,, | Performed by: INTERNAL MEDICINE

## 2023-04-20 PROCEDURE — 1160F RVW MEDS BY RX/DR IN RCRD: CPT | Mod: CPTII,,, | Performed by: INTERNAL MEDICINE

## 2023-04-20 PROCEDURE — 99395 PREV VISIT EST AGE 18-39: CPT | Mod: ,,, | Performed by: INTERNAL MEDICINE

## 2023-04-20 PROCEDURE — 3008F BODY MASS INDEX DOCD: CPT | Mod: CPTII,,, | Performed by: INTERNAL MEDICINE

## 2023-04-20 PROCEDURE — 3078F PR MOST RECENT DIASTOLIC BLOOD PRESSURE < 80 MM HG: ICD-10-PCS | Mod: CPTII,,, | Performed by: INTERNAL MEDICINE

## 2023-04-20 PROCEDURE — 1159F PR MEDICATION LIST DOCUMENTED IN MEDICAL RECORD: ICD-10-PCS | Mod: CPTII,,, | Performed by: INTERNAL MEDICINE

## 2023-04-20 PROCEDURE — 4010F PR ACE/ARB THEARPY RXD/TAKEN: ICD-10-PCS | Mod: CPTII,,, | Performed by: INTERNAL MEDICINE

## 2023-04-20 PROCEDURE — 3074F PR MOST RECENT SYSTOLIC BLOOD PRESSURE < 130 MM HG: ICD-10-PCS | Mod: CPTII,,, | Performed by: INTERNAL MEDICINE

## 2023-04-20 PROCEDURE — 36415 PR COLLECTION VENOUS BLOOD,VENIPUNCTURE: ICD-10-PCS | Mod: ,,, | Performed by: INTERNAL MEDICINE

## 2023-04-20 PROCEDURE — 99395 PR PREVENTIVE VISIT,EST,18-39: ICD-10-PCS | Mod: ,,, | Performed by: INTERNAL MEDICINE

## 2023-04-20 PROCEDURE — 1160F PR REVIEW ALL MEDS BY PRESCRIBER/CLIN PHARMACIST DOCUMENTED: ICD-10-PCS | Mod: CPTII,,, | Performed by: INTERNAL MEDICINE

## 2023-04-20 PROCEDURE — 3078F DIAST BP <80 MM HG: CPT | Mod: CPTII,,, | Performed by: INTERNAL MEDICINE

## 2023-04-20 PROCEDURE — 36415 COLL VENOUS BLD VENIPUNCTURE: CPT | Mod: ,,, | Performed by: INTERNAL MEDICINE

## 2023-04-20 PROCEDURE — 3074F SYST BP LT 130 MM HG: CPT | Mod: CPTII,,, | Performed by: INTERNAL MEDICINE

## 2023-04-20 PROCEDURE — 3008F PR BODY MASS INDEX (BMI) DOCUMENTED: ICD-10-PCS | Mod: CPTII,,, | Performed by: INTERNAL MEDICINE

## 2023-04-20 RX ORDER — LISINOPRIL 5 MG/1
5 TABLET ORAL
COMMUNITY
Start: 2023-03-22

## 2023-04-20 RX ORDER — ASPIRIN 325 MG
325 TABLET ORAL DAILY
COMMUNITY

## 2024-04-23 ENCOUNTER — TELEPHONE (OUTPATIENT)
Dept: PRIMARY CARE CLINIC | Facility: CLINIC | Age: 34
End: 2024-04-23
Payer: COMMERCIAL

## 2024-04-25 ENCOUNTER — PATIENT MESSAGE (OUTPATIENT)
Dept: PRIMARY CARE CLINIC | Facility: CLINIC | Age: 34
End: 2024-04-25

## 2024-05-20 ENCOUNTER — TELEPHONE (OUTPATIENT)
Dept: PRIMARY CARE CLINIC | Facility: CLINIC | Age: 34
End: 2024-05-20
Payer: COMMERCIAL

## 2024-05-23 NOTE — PROGRESS NOTES
Kathryn Russell MD   [address] [address]     Patient ID: [id number]     Chief Complaint: Wellness visit      HPI:     Scott Montiel is a 33 y.o. male here today for annual Wellness exam. No other doctor visits, his heart doctor rescheduled his visit.       Subjective:     Review of Systems   HENT:  Negative for hearing loss.    Eyes:  Negative for discharge.   Respiratory:  Negative for wheezing.    Cardiovascular:  Negative for chest pain and palpitations.        Occasionally has a hard palpitation like heart wants to stop but it never goes into the afib like it had.    Gastrointestinal:  Negative for blood in stool, constipation, diarrhea and vomiting.   Genitourinary:  Negative for hematuria and urgency.   Musculoskeletal:  Positive for neck pain.        It's been hurting for eight months. It feels like his has a knot.   Neurological:  Positive for tingling. Negative for weakness and headaches.        Occasionally feels tingling in right index finger and thumb, it works it's way up the arm. It doesn't affect strength. It seems to start in his shoulder. He can shake his arm and it will go away.    Endo/Heme/Allergies:  Negative for polydipsia.   Psychiatric/Behavioral:          He changed jobs and got in debt so he went into a deep hole. He had been drinking more and had stopped his medications. His job pays less and he still has a hard time doing things.  With job change he's finding more trouble with focus again, he is starting tasks but not finishing them at work and at home, he's tried to use reminders but finds he's not keeping track of them.    Answers submitted by the patient for this visit:  Review of Systems Questionnaire (Submitted on 5/20/2024)  activity change: Yes  unexpected weight change: Yes  rhinorrhea: No  trouble swallowing: No  visual disturbance: No  chest tightness: No  polyuria: No  difficulty urinating: No  joint swelling: No  arthralgias: No  confusion: No  dysphoric mood:  No      Past Medical History:   Diagnosis Date    Anxiety     Asthma     Atrial fibrillation with RVR     Tension headache     Valvular regurgitation         Past Surgical History:   Procedure Laterality Date    ABLATION OF ARRHYTHMOGENIC FOCUS FOR ATRIAL FIBRILLATION      ABLATION OF ARRHYTHMOGENIC FOCUS FOR ATRIAL FLUTTER  06/09/2022    TONSILLECTOMY         No family history on file.     Social History     Socioeconomic History    Marital status:    Tobacco Use    Smoking status: Former    Smokeless tobacco: Never   Substance and Sexual Activity    Alcohol use: Not Currently    Drug use: Not Currently     Types: Marijuana     Social Determinants of Health     Financial Resource Strain: Low Risk  (5/20/2024)    Overall Financial Resource Strain (CARDIA)     Difficulty of Paying Living Expenses: Not hard at all   Food Insecurity: No Food Insecurity (5/20/2024)    Hunger Vital Sign     Worried About Running Out of Food in the Last Year: Never true     Ran Out of Food in the Last Year: Never true   Transportation Needs: No Transportation Needs (4/20/2023)    PRAPARE - Transportation     Lack of Transportation (Medical): No     Lack of Transportation (Non-Medical): No   Physical Activity: Sufficiently Active (5/20/2024)    Exercise Vital Sign     Days of Exercise per Week: 6 days     Minutes of Exercise per Session: 50 min   Stress: Stress Concern Present (5/20/2024)    [employer] - Occupational Stress Questionnaire     Feeling of Stress : To some extent   Housing Stability: Low Risk  (4/20/2023)    Housing Stability Vital Sign     Unable to Pay for Housing in the Last Year: No     Number of Places Lived in the Last Year: 1     Unstable Housing in the Last Year: No       Review of patient's allergies indicates:  No Known Allergies    Outpatient Medications Marked as Taking for the 5/27/24 encounter (Office Visit) with Kathryn Russell MD   Medication Sig Dispense Refill    aspirin  "325 MG tablet Take 325 mg by mouth once daily.      diltiaZEM (CARDIZEM CD) 360 MG 24 hr capsule Take 360 mg by mouth once daily.      lisinopriL (PRINIVIL,ZESTRIL) 5 MG tablet Take 5 mg by mouth.         Patient Care Team:  Kathryn Russell MD as PCP - General (Internal Medicine)  Dimas Lowe as Licensed Professional Counselor  Rob Scherer MD as Consulting Physician (Cardiology)       Objective:     /89   Pulse 83   Temp 97.3 °F (36.3 °C) (Oral)   Resp 16   Ht 6' 2" (1.88 m)   Wt 115.7 kg (255 lb)   SpO2 97%   BMI 32.74 kg/m²     Physical Exam  Vitals reviewed.   Constitutional:       Appearance: Normal appearance.   HENT:      Head: Normocephalic and atraumatic.      Mouth/Throat:      Mouth: Mucous membranes are moist.      Pharynx: Oropharynx is clear. No oropharyngeal exudate or posterior oropharyngeal erythema.   Eyes:      General: No scleral icterus.     Extraocular Movements: Extraocular movements intact.      Conjunctiva/sclera: Conjunctivae normal.      Pupils: Pupils are equal, round, and reactive to light.   Cardiovascular:      Rate and Rhythm: Normal rate and regular rhythm.   Pulmonary:      Effort: Pulmonary effort is normal.      Breath sounds: Normal breath sounds.      Comments: Opening had one episode of a wheeze  Abdominal:      General: Bowel sounds are normal.      Palpations: Abdomen is soft.      Tenderness: There is no abdominal tenderness. There is no guarding.   Musculoskeletal:         General: No tenderness or deformity. Normal range of motion.      Cervical back: Normal range of motion and neck supple.      Comments: I can't elicit any tenderness on the right scapula or neck, I don't feel a muscle spasm   Lymphadenopathy:      Cervical: No cervical adenopathy.   Skin:     General: Skin is warm and dry.      Findings: No erythema or rash.   Neurological:      General: No focal deficit present.      Mental Status: He is alert and oriented to person, place, and " time.      Sensory: No sensory deficit.      Motor: No weakness.      Gait: Gait normal.   Psychiatric:         Mood and Affect: Mood normal.         Behavior: Behavior normal.         Thought Content: Thought content normal.         Judgment: Judgment normal.               Assessment/Plan:     1. Wellness examination  Comments:  Generally good habits, had dropped a bit with stress, encouraged more fiber in diet  Orders:  -     CBC Auto Differential  -     Comprehensive Metabolic Panel  -     Lipid Panel  -     TSH  -     Urinalysis    2. Neck pain  Comments:  I agree start with a massage and if not improving we'll set upstudies    3. Paresthesias in right hand  Comments:  Intermittent, he'll monitor, I'd think it is likely from the same issue as neck and muscle seems most likely    4. Attention and concentration deficit  Comments:  Strattera did not help, he'll see heart doctor and see if any of the stimulants would be ok with him. He does not want to be on long term             Follow up in about 1 year (around 5/27/2025) for Wellness and if start medication for ADHD will set up appropriate follow up. In addition to their scheduled follow up, the patient has also been instructed to follow up on as needed basis.     Signature:  Kathryn Russell MD  [facility]  [id number] [address]

## 2024-05-27 ENCOUNTER — OFFICE VISIT (OUTPATIENT)
Dept: PRIMARY CARE CLINIC | Facility: CLINIC | Age: 34
End: 2024-05-27
Payer: COMMERCIAL

## 2024-05-27 VITALS
RESPIRATION RATE: 16 BRPM | TEMPERATURE: 97 F | OXYGEN SATURATION: 97 % | SYSTOLIC BLOOD PRESSURE: 139 MMHG | DIASTOLIC BLOOD PRESSURE: 89 MMHG | BODY MASS INDEX: 32.73 KG/M2 | WEIGHT: 255 LBS | HEART RATE: 83 BPM | HEIGHT: 74 IN

## 2024-05-27 DIAGNOSIS — R20.2 PARESTHESIAS IN RIGHT HAND: ICD-10-CM

## 2024-05-27 DIAGNOSIS — Z00.00 WELLNESS EXAMINATION: Primary | ICD-10-CM

## 2024-05-27 DIAGNOSIS — R41.840 ATTENTION AND CONCENTRATION DEFICIT: ICD-10-CM

## 2024-05-27 DIAGNOSIS — M54.2 NECK PAIN: ICD-10-CM

## 2024-05-27 LAB
APPEARANCE UR: NORMAL
BILIRUB UR QL STRIP: NEGATIVE
COLOR UR: YELLOW
GLUCOSE UR QL STRIP: NEGATIVE
HGB UR QL STRIP: NEGATIVE
KETONES UR QL STRIP: NEGATIVE
LEUKOCYTE ESTERASE UR QL STRIP: NEGATIVE
MICRO URNS: NORMAL
NITRITE UR QL STRIP: NEGATIVE
PH UR STRIP: 7 [PH] (ref 5–7.5)
PROT UR QL STRIP: NEGATIVE
SP GR UR STRIP: 1.01 (ref 1–1.03)
UROBILINOGEN UR STRIP-MCNC: 0.2 MG/DL (ref 0.2–1)

## 2024-05-27 PROCEDURE — 1160F RVW MEDS BY RX/DR IN RCRD: CPT | Mod: CPTII,,, | Performed by: INTERNAL MEDICINE

## 2024-05-27 PROCEDURE — 3008F BODY MASS INDEX DOCD: CPT | Mod: CPTII,,, | Performed by: INTERNAL MEDICINE

## 2024-05-27 PROCEDURE — 3079F DIAST BP 80-89 MM HG: CPT | Mod: CPTII,,, | Performed by: INTERNAL MEDICINE

## 2024-05-27 PROCEDURE — 3075F SYST BP GE 130 - 139MM HG: CPT | Mod: CPTII,,, | Performed by: INTERNAL MEDICINE

## 2024-05-27 PROCEDURE — 1159F MED LIST DOCD IN RCRD: CPT | Mod: CPTII,,, | Performed by: INTERNAL MEDICINE

## 2024-05-27 PROCEDURE — 4010F ACE/ARB THERAPY RXD/TAKEN: CPT | Mod: CPTII,,, | Performed by: INTERNAL MEDICINE

## 2024-05-27 PROCEDURE — 99395 PREV VISIT EST AGE 18-39: CPT | Mod: ,,, | Performed by: INTERNAL MEDICINE

## 2024-05-27 NOTE — PATIENT INSTRUCTIONS
Olu Chavez,     If you are due for any health screening(s) below please notify me so we can arrange them to be ordered and scheduled. Most healthy patients at your age complete them, but you are free to accept or refuse.     If you can't do it, I'll definitely understand. If you can, I'd certainly appreciate it!    All of your core healthy metrics are met.

## 2024-05-28 ENCOUNTER — TELEPHONE (OUTPATIENT)
Dept: FAMILY MEDICINE | Facility: CLINIC | Age: 34
End: 2024-05-28
Payer: COMMERCIAL

## 2024-05-28 LAB
ALBUMIN SERPL-MCNC: 4.9 G/DL (ref 4.1–5.1)
ALBUMIN/GLOB SERPL: 1.9 {RATIO} (ref 1.2–2.2)
ALP SERPL-CCNC: 67 IU/L (ref 44–121)
ALT SERPL-CCNC: 27 IU/L (ref 0–44)
AST SERPL-CCNC: 32 IU/L (ref 0–40)
BASOPHILS # BLD AUTO: 0.1 X10E3/UL (ref 0–0.2)
BASOPHILS NFR BLD AUTO: 1 %
BILIRUB SERPL-MCNC: 0.6 MG/DL (ref 0–1.2)
BUN SERPL-MCNC: 15 MG/DL (ref 6–20)
BUN/CREAT SERPL: 12 (ref 9–20)
CALCIUM SERPL-MCNC: 9.4 MG/DL (ref 8.7–10.2)
CHLORIDE SERPL-SCNC: 102 MMOL/L (ref 96–106)
CHOLEST SERPL-MCNC: 150 MG/DL (ref 100–199)
CO2 SERPL-SCNC: 25 MMOL/L (ref 20–29)
CREAT SERPL-MCNC: 1.23 MG/DL (ref 0.76–1.27)
EOSINOPHIL # BLD AUTO: 0.1 X10E3/UL (ref 0–0.4)
EOSINOPHIL NFR BLD AUTO: 1 %
ERYTHROCYTE [DISTWIDTH] IN BLOOD BY AUTOMATED COUNT: 12.4 % (ref 11.6–15.4)
EST. GFR  (NO RACE VARIABLE): 80 ML/MIN/1.73
GLOBULIN SER CALC-MCNC: 2.6 G/DL (ref 1.5–4.5)
GLUCOSE SERPL-MCNC: 90 MG/DL (ref 70–99)
HCT VFR BLD AUTO: 45.1 % (ref 37.5–51)
HDLC SERPL-MCNC: 62 MG/DL
HGB BLD-MCNC: 15.6 G/DL (ref 13–17.7)
IMM GRANULOCYTES NFR BLD AUTO: 0 %
LDLC SERPL CALC-MCNC: 74 MG/DL (ref 0–99)
LYMPHOCYTES # BLD AUTO: 2 X10E3/UL (ref 0.7–3.1)
LYMPHOCYTES NFR BLD AUTO: 27 %
MCH RBC QN AUTO: 30.6 PG (ref 26.6–33)
MCHC RBC AUTO-ENTMCNC: 34.6 G/DL (ref 31.5–35.7)
MCV RBC AUTO: 88 FL (ref 79–97)
MONOCYTES # BLD AUTO: 0.6 X10E3/UL (ref 0.1–0.9)
MONOCYTES NFR BLD AUTO: 8 %
NEUTROPHILS # BLD AUTO: 4.8 X10E3/UL (ref 1.4–7)
NEUTROPHILS NFR BLD AUTO: 63 %
PLATELET # BLD AUTO: 260 X10E3/UL (ref 150–450)
POTASSIUM SERPL-SCNC: 4 MMOL/L (ref 3.5–5.2)
PROT SERPL-MCNC: 7.5 G/DL (ref 6–8.5)
RBC # BLD AUTO: 5.1 X10E6/UL (ref 4.14–5.8)
SODIUM SERPL-SCNC: 139 MMOL/L (ref 134–144)
TRIGL SERPL-MCNC: 68 MG/DL (ref 0–149)
TSH SERPL DL<=0.005 MIU/L-ACNC: 1.4 UIU/ML (ref 0.45–4.5)
VLDLC SERPL CALC-MCNC: 14 MG/DL (ref 5–40)
WBC # BLD AUTO: 7.6 X10E3/UL (ref 3.4–10.8)

## 2024-05-28 NOTE — TELEPHONE ENCOUNTER
----- Message from Nina Hyde sent at 5/28/2024  2:49 PM CDT -----  Regarding: return call  Type:  Patient Returning Call    Who Called:pt  Who Left Message for Patient:  Does the patient know what this is regarding?:test results  Would the patient rather a call back or a response via [facility]? C/b  Best Call Back Number:[phone]  Additional Information:

## 2024-06-12 ENCOUNTER — HOSPITAL ENCOUNTER (EMERGENCY)
Facility: HOSPITAL | Age: 34
Discharge: HOME | End: 2024-06-12
Attending: EMERGENCY MEDICINE
Payer: COMMERCIAL

## 2024-06-12 VITALS
TEMPERATURE: 98 F | HEIGHT: 75 IN | RESPIRATION RATE: 13 BRPM | WEIGHT: 255 LBS | DIASTOLIC BLOOD PRESSURE: 81 MMHG | OXYGEN SATURATION: 98 % | BODY MASS INDEX: 31.71 KG/M2 | SYSTOLIC BLOOD PRESSURE: 99 MMHG | HEART RATE: 107 BPM

## 2024-06-12 DIAGNOSIS — R00.2 PALPITATIONS: ICD-10-CM

## 2024-06-12 DIAGNOSIS — I48.11 LONGSTANDING PERSISTENT ATRIAL FIBRILLATION: Primary | ICD-10-CM

## 2024-06-12 LAB
ALBUMIN SERPL-MCNC: 4.6 G/DL (ref 3.5–5)
ALBUMIN/GLOB SERPL: 1.6 RATIO (ref 1.1–2)
ALP SERPL-CCNC: 68 UNIT/L (ref 40–150)
ALT SERPL-CCNC: 26 UNIT/L (ref 0–55)
ANION GAP SERPL CALC-SCNC: 8 MEQ/L
AST SERPL-CCNC: 29 UNIT/L (ref 5–34)
BASOPHILS # BLD AUTO: 0.06 X10(3)/MCL
BASOPHILS NFR BLD AUTO: 0.7 %
BILIRUB SERPL-MCNC: 0.4 MG/DL
BUN SERPL-MCNC: 15.3 MG/DL (ref 8.9–20.6)
CALCIUM SERPL-MCNC: 9.6 MG/DL (ref 8.4–10.2)
CHLORIDE SERPL-SCNC: 105 MMOL/L (ref 98–107)
CO2 SERPL-SCNC: 26 MMOL/L (ref 22–29)
CREAT SERPL-MCNC: 1.32 MG/DL (ref 0.73–1.18)
CREAT/UREA NIT SERPL: 12
EOSINOPHIL # BLD AUTO: 0.16 X10(3)/MCL (ref 0–0.9)
EOSINOPHIL NFR BLD AUTO: 1.9 %
ERYTHROCYTE [DISTWIDTH] IN BLOOD BY AUTOMATED COUNT: 12.2 % (ref 11.5–17)
GFR SERPLBLD CREATININE-BSD FMLA CKD-EPI: >60 ML/MIN/1.73/M2
GLOBULIN SER-MCNC: 2.9 GM/DL (ref 2.4–3.5)
GLUCOSE SERPL-MCNC: 84 MG/DL (ref 74–100)
HCT VFR BLD AUTO: 44.4 % (ref 42–52)
HGB BLD-MCNC: 15.8 G/DL (ref 14–18)
IMM GRANULOCYTES # BLD AUTO: 0.02 X10(3)/MCL (ref 0–0.04)
IMM GRANULOCYTES NFR BLD AUTO: 0.2 %
INR PPP: 0.9
LYMPHOCYTES # BLD AUTO: 3.81 X10(3)/MCL (ref 0.6–4.6)
LYMPHOCYTES NFR BLD AUTO: 44.7 %
MCH RBC QN AUTO: 30.6 PG (ref 27–31)
MCHC RBC AUTO-ENTMCNC: 35.6 G/DL (ref 33–36)
MCV RBC AUTO: 85.9 FL (ref 80–94)
MONOCYTES # BLD AUTO: 0.82 X10(3)/MCL (ref 0.1–1.3)
MONOCYTES NFR BLD AUTO: 9.6 %
NEUTROPHILS # BLD AUTO: 3.65 X10(3)/MCL (ref 2.1–9.2)
NEUTROPHILS NFR BLD AUTO: 42.9 %
NRBC BLD AUTO-RTO: 0 %
OHS QRS DURATION: 86 MS
OHS QTC CALCULATION: 439 MS
PLATELET # BLD AUTO: 272 X10(3)/MCL (ref 130–400)
PMV BLD AUTO: 9 FL (ref 7.4–10.4)
POTASSIUM SERPL-SCNC: 3.8 MMOL/L (ref 3.5–5.1)
PROT SERPL-MCNC: 7.5 GM/DL (ref 6.4–8.3)
PROTHROMBIN TIME: 12.4 SECONDS (ref 12.5–14.5)
RBC # BLD AUTO: 5.17 X10(6)/MCL (ref 4.7–6.1)
SODIUM SERPL-SCNC: 139 MMOL/L (ref 136–145)
TROPONIN I SERPL-MCNC: <0.01 NG/ML (ref 0–0.04)
WBC # SPEC AUTO: 8.52 X10(3)/MCL (ref 4.5–11.5)

## 2024-06-12 PROCEDURE — 93005 ELECTROCARDIOGRAM TRACING: CPT

## 2024-06-12 PROCEDURE — 99284 EMERGENCY DEPT VISIT MOD MDM: CPT | Mod: 25

## 2024-06-12 PROCEDURE — 93010 ELECTROCARDIOGRAM REPORT: CPT | Mod: ,,, | Performed by: INTERNAL MEDICINE

## 2024-06-12 PROCEDURE — 85610 PROTHROMBIN TIME: CPT | Performed by: PHYSICIAN ASSISTANT

## 2024-06-12 PROCEDURE — 80053 COMPREHEN METABOLIC PANEL: CPT | Performed by: PHYSICIAN ASSISTANT

## 2024-06-12 PROCEDURE — 85025 COMPLETE CBC W/AUTO DIFF WBC: CPT | Performed by: PHYSICIAN ASSISTANT

## 2024-06-12 PROCEDURE — 84484 ASSAY OF TROPONIN QUANT: CPT | Performed by: PHYSICIAN ASSISTANT

## 2024-06-12 PROCEDURE — 25000003 PHARM REV CODE 250: Performed by: EMERGENCY MEDICINE

## 2024-06-12 PROCEDURE — 96375 TX/PRO/DX INJ NEW DRUG ADDON: CPT

## 2024-06-12 PROCEDURE — 96374 THER/PROPH/DIAG INJ IV PUSH: CPT

## 2024-06-12 RX ORDER — DILTIAZEM HYDROCHLORIDE 5 MG/ML
25 INJECTION INTRAVENOUS
Status: COMPLETED | OUTPATIENT
Start: 2024-06-12 | End: 2024-06-12

## 2024-06-12 RX ORDER — METOPROLOL SUCCINATE 25 MG/1
25 TABLET, EXTENDED RELEASE ORAL ONCE
Status: DISCONTINUED | OUTPATIENT
Start: 2024-06-13 | End: 2024-06-12

## 2024-06-12 RX ORDER — METOPROLOL TARTRATE 1 MG/ML
10 INJECTION, SOLUTION INTRAVENOUS ONCE
Status: COMPLETED | OUTPATIENT
Start: 2024-06-12 | End: 2024-06-12

## 2024-06-12 RX ORDER — METOPROLOL SUCCINATE 25 MG/1
25 TABLET, EXTENDED RELEASE ORAL DAILY
Qty: 90 TABLET | Refills: 3 | Status: SHIPPED | OUTPATIENT
Start: 2024-06-12 | End: 2025-06-12

## 2024-06-12 RX ORDER — METOPROLOL SUCCINATE 25 MG/1
25 TABLET, EXTENDED RELEASE ORAL ONCE
Status: COMPLETED | OUTPATIENT
Start: 2024-06-12 | End: 2024-06-12

## 2024-06-12 RX ADMIN — DILTIAZEM HYDROCHLORIDE 25 MG: 5 INJECTION, SOLUTION INTRAVENOUS at 08:06

## 2024-06-12 RX ADMIN — METOPROLOL TARTRATE 10 MG: 1 INJECTION, SOLUTION INTRAVENOUS at 10:06

## 2024-06-12 RX ADMIN — METOPROLOL SUCCINATE 25 MG: 25 TABLET, EXTENDED RELEASE ORAL at 11:06

## 2024-06-13 NOTE — FIRST PROVIDER EVALUATION
"Medical screening examination initiated.  I have conducted a focused provider triage encounter, findings are as follows:    Chief Complaint   Patient presents with    Palpitations     Pt believes he may be going in and out of afib for approx 45min pta. States noticed HR on apple watch from 70s to 140s. Currently on diltiazem and lisinopril for hx of afib and htn. Denies any chest pain or palpitations.      Brief history of present illness:  33 y.o. male presents to the ED with     Vitals:    06/12/24 2021   BP: (!) 218/98   Pulse: (!) 140   Resp: 20   Temp: 98.3 °F (36.8 °C)   SpO2: 98%   Weight: 115.7 kg (255 lb)   Height: 6' 3" (1.905 m)       Pertinent physical exam:  Awake, alert, non-labored respirations    Brief workup plan:  labs, EKG, CXR    Preliminary workup initiated; this workup will be continued and followed by the physician or advanced practice provider that is assigned to the patient when roomed.  "

## 2024-06-13 NOTE — ED PROVIDER NOTES
Encounter Date: 6/12/2024    SCRIBE #1 NOTE: I, Wyatt Vann, am scribing for, and in the presence of,  Jovany Tariq III, MD. I have scribed the entire note.       History     Chief Complaint   Patient presents with    Palpitations     Pt believes he may be going in and out of afib for approx 45min pta. States noticed HR on apple watch from 70s to 140s. Currently on diltiazem and lisinopril for hx of afib and htn. Denies any chest pain or palpitations.      33 year old male with a hx of A fib, asthma, and anxiety presents to the ED for palpitations. Pt states he feels like he was going into A fib about an hour PTA. Pt noticed his heart rate jumped from mid 70's to about 140. Pt notes he did have an energy drink this morning. Pt states he took Lisinopril and Diltiazem at about 1945. Pt states his symptoms did not improve. Pt denies any chest pain or SOB. Pt has had 2 ablations done in the past.     The history is provided by the patient. No  was used.     Review of patient's allergies indicates:  No Known Allergies  Past Medical History:   Diagnosis Date    Anxiety     Asthma     Atrial fibrillation with RVR     Tension headache     Valvular regurgitation      Past Surgical History:   Procedure Laterality Date    ABLATION OF ARRHYTHMOGENIC FOCUS FOR ATRIAL FIBRILLATION      ABLATION OF ARRHYTHMOGENIC FOCUS FOR ATRIAL FLUTTER  06/09/2022    TONSILLECTOMY       No family history on file.  Social History     Tobacco Use    Smoking status: Former    Smokeless tobacco: Never   Substance Use Topics    Alcohol use: Not Currently    Drug use: Not Currently     Types: Marijuana     Review of Systems   Constitutional:  Negative for fatigue, fever and unexpected weight change.   HENT:  Negative for congestion and rhinorrhea.    Eyes:  Negative for pain.   Respiratory:  Negative for chest tightness, shortness of breath and wheezing.    Cardiovascular:  Positive for palpitations. Negative for chest pain.    Gastrointestinal:  Negative for abdominal pain, constipation, diarrhea, nausea and vomiting.   Genitourinary:  Negative for dysuria.   Musculoskeletal:  Negative for back pain and neck pain.   Skin:  Negative for rash.   Allergic/Immunologic: Negative for environmental allergies, food allergies and immunocompromised state.   Neurological:  Negative for dizziness and speech difficulty.   Hematological:  Does not bruise/bleed easily.   Psychiatric/Behavioral:  Negative for sleep disturbance and suicidal ideas.        Physical Exam     Initial Vitals [06/12/24 2021]   BP Pulse Resp Temp SpO2   (!) 218/98 (!) 140 20 98.3 °F (36.8 °C) 98 %      MAP       --         Physical Exam    Nursing note and vitals reviewed.  Constitutional: No distress.   HENT:   Head: Normocephalic and atraumatic.   Neck: Trachea normal.   Cardiovascular:  Regular rhythm.   Tachycardia present.         No murmur heard.  Pulmonary/Chest: Breath sounds normal. No respiratory distress.   Abdominal: Abdomen is soft. Bowel sounds are normal. He exhibits no distension. There is no abdominal tenderness.   Musculoskeletal:         General: Normal range of motion.      Lumbar back: Normal.     Neurological: He is alert and oriented to person, place, and time. He has normal strength. No cranial nerve deficit.   Skin: Skin is warm and dry. No rash noted.   Psychiatric: He has a normal mood and affect. Judgment normal.         ED Course   Procedures  Labs Reviewed   COMPREHENSIVE METABOLIC PANEL - Abnormal; Notable for the following components:       Result Value    Creatinine 1.32 (*)     All other components within normal limits   PROTIME-INR - Abnormal; Notable for the following components:    PT 12.4 (*)     All other components within normal limits   TROPONIN I - Normal   CBC W/ AUTO DIFFERENTIAL    Narrative:     The following orders were created for panel order CBC auto differential.  Procedure                               Abnormality          Status                     ---------                               -----------         ------                     CBC with Differential[[id number]]                           Final result                 Please view results for these tests on the individual orders.   CBC WITH DIFFERENTIAL     EKG Readings: (Independently Interpreted)   Initial Reading: No STEMI. Rhythm: Sinus Tachycardia. Heart Rate: 138. Ectopy: No Ectopy. Conduction: Normal. ST Segments: Normal ST Segments. T Waves: Normal. Axis: Normal.   Done on 6/12/24 at 2017.      ECG Results              EKG 12-lead (Final result)        Collection Time Result Time QRS Duration OHS QTC Calculation    06/12/24 20:17:01 06/12/24 22:05:19 86 [id number]                     Final result by Interface, Lab In [facility] (06/12/24 22:05:23)                   Narrative:    Test Reason : R00.2,    Vent. Rate : 138 BPM     Atrial Rate : 138 BPM     P-R Int : 114 ms          QRS Dur : 086 ms      QT Int : 290 ms       P-R-T Axes : 083 098 -51 degrees     QTc Int : 439 ms    Sinus tachycardia  Rightward axis  Pulmonary disease pattern  Nonspecific ST abnormality  Abnormal QRS-T angle, consider primary T wave abnormality  Abnormal ECG  When compared with ECG of 09-JUN-2022 10:03,  Vent. rate has increased BY  47 BPM  Incomplete right bundle branch block is no longer Present  Confirmed by Dar Escobar MD ([id number]) on 6/12/2024 10:05:16 PM    Referred By:             Confirmed By:Dar Escobar MD                                  Imaging Results    None          Medications   diltiaZEM injection 25 mg (25 mg Intravenous Given 6/12/24 2051)   metoprolol injection 10 mg (10 mg Intravenous Given 6/12/24 2231)   metoprolol succinate (TOPROL-XL) 24 hr tablet 25 mg (25 mg Oral Given 6/12/24 2338)     Medical Decision Making  The differential diagnosis includes, but is not limited to, electrolyte disturbance, A fib, A flutter, or SVT.     AFib RVR with a rate of 130 patient without any complaints  chest pain no shortness a breath ambulates without difficulty patient given extra dose of Cardizem and metoprolol does feel better discussed case with Traci with cardiology service patient will be discharged follow up with cardiology service tomorrow return emergency room as needed    Problems Addressed:  Longstanding persistent atrial fibrillation: complicated acute illness or injury with systemic symptoms that poses a threat to life or bodily functions  Palpitations: acute illness or injury    Risk  Prescription drug management.            Scribe Attestation:   Scribe #1: I performed the above scribed service and the documentation accurately describes the services I performed. I attest to the accuracy of the note.    Attending Attestation:           Physician Attestation for Scribe:  Physician Attestation Statement for Scribe #1: I, Jovany Tariq III, MD, reviewed documentation, as scribed by Wyatt Vann in my presence, and it is both accurate and complete.                                    Clinical Impression:  Final diagnoses:  [R00.2] Palpitations  [I48.11] Longstanding persistent atrial fibrillation (Primary)          ED Disposition Condition    Discharge Stable          ED Prescriptions       Medication Sig Dispense Start Date End Date Auth. Provider    metoprolol succinate (TOPROL-XL) 25 MG 24 hr tablet Take 1 tablet (25 mg total) by mouth once daily. 90 tablet 6/12/2024 6/12/2025 Jovany Tariq III, MD          Follow-up Information       Follow up With Specialties Details Why Contact Info    Rob Scherer MD Cardiology, Pathology In 1 day  [address] [place] [id number]  [phone]               Jovany Tariq III, MD  06/14/24 [id number]

## 2024-06-13 NOTE — ED TRIAGE NOTES
Pt believes he may be going in and out of afib for approx 45min pta. States noticed HR on apple watch from 70s to 140s. Currently on diltiazem and lisinopril for hx of afib and htn. Denies any chest pain or palpitations.

## 2025-06-02 ENCOUNTER — OFFICE VISIT (OUTPATIENT)
Dept: PRIMARY CARE CLINIC | Facility: CLINIC | Age: 35
End: 2025-06-02
Payer: COMMERCIAL

## 2025-06-02 VITALS
DIASTOLIC BLOOD PRESSURE: 91 MMHG | HEIGHT: 75 IN | WEIGHT: 205 LBS | HEART RATE: 93 BPM | TEMPERATURE: 98 F | OXYGEN SATURATION: 97 % | BODY MASS INDEX: 25.49 KG/M2 | SYSTOLIC BLOOD PRESSURE: 132 MMHG | RESPIRATION RATE: 16 BRPM

## 2025-06-02 DIAGNOSIS — I48.0 PAROXYSMAL ATRIAL FIBRILLATION: ICD-10-CM

## 2025-06-02 DIAGNOSIS — F41.1 GENERALIZED ANXIETY DISORDER: ICD-10-CM

## 2025-06-02 DIAGNOSIS — Z00.00 WELLNESS EXAMINATION: Primary | ICD-10-CM

## 2025-06-02 DIAGNOSIS — Z83.3 FAMILY HISTORY OF DIABETES MELLITUS: ICD-10-CM

## 2025-06-02 DIAGNOSIS — I10 PRIMARY HYPERTENSION: ICD-10-CM

## 2025-06-02 LAB
ALBUMIN SERPL-MCNC: 4.6 G/DL (ref 4.1–5.1)
ALP SERPL-CCNC: 84 IU/L (ref 44–121)
ALT SERPL-CCNC: 26 IU/L (ref 0–44)
APPEARANCE UR: CLEAR
AST SERPL-CCNC: 36 IU/L (ref 0–40)
BASOPHILS # BLD AUTO: 0.1 X10E3/UL (ref 0–0.2)
BASOPHILS NFR BLD AUTO: 1 %
BILIRUB SERPL-MCNC: 0.6 MG/DL (ref 0–1.2)
BILIRUB UR QL STRIP: NEGATIVE
BUN SERPL-MCNC: 20 MG/DL (ref 6–20)
BUN/CREAT SERPL: 18 (ref 9–20)
CALCIUM SERPL-MCNC: 9.3 MG/DL (ref 8.7–10.2)
CHLORIDE SERPL-SCNC: 102 MMOL/L (ref 96–106)
CHOLEST SERPL-MCNC: 125 MG/DL (ref 100–199)
CO2 SERPL-SCNC: 23 MMOL/L (ref 20–29)
COLOR UR: YELLOW
CREAT SERPL-MCNC: 1.11 MG/DL (ref 0.76–1.27)
EOSINOPHIL # BLD AUTO: 0.1 X10E3/UL (ref 0–0.4)
EOSINOPHIL NFR BLD AUTO: 1 %
ERYTHROCYTE [DISTWIDTH] IN BLOOD BY AUTOMATED COUNT: 11.9 % (ref 11.6–15.4)
EST. GFR  (NO RACE VARIABLE): 90 ML/MIN/1.73
GLOBULIN SER CALC-MCNC: 2.1 G/DL (ref 1.5–4.5)
GLUCOSE SERPL-MCNC: 89 MG/DL (ref 70–99)
GLUCOSE UR QL STRIP: NEGATIVE
HBA1C MFR BLD: 5.2 % (ref 4.8–5.6)
HCT VFR BLD AUTO: 41.9 % (ref 37.5–51)
HDLC SERPL-MCNC: 70 MG/DL
HGB BLD-MCNC: 14.1 G/DL (ref 13–17.7)
HGB UR QL STRIP: NEGATIVE
IMM GRANULOCYTES NFR BLD AUTO: 0 %
KETONES UR QL STRIP: NEGATIVE
LDLC SERPL CALC-MCNC: 47 MG/DL (ref 0–99)
LEUKOCYTE ESTERASE UR QL STRIP: NEGATIVE
LYMPHOCYTES # BLD AUTO: 1.7 X10E3/UL (ref 0.7–3.1)
LYMPHOCYTES NFR BLD AUTO: 31 %
MCH RBC QN AUTO: 30 PG (ref 26.6–33)
MCHC RBC AUTO-ENTMCNC: 33.7 G/DL (ref 31.5–35.7)
MCV RBC AUTO: 89 FL (ref 79–97)
MICRO URNS: NORMAL
MONOCYTES # BLD AUTO: 0.5 X10E3/UL (ref 0.1–0.9)
MONOCYTES NFR BLD AUTO: 9 %
NEUTROPHILS # BLD AUTO: 3.3 X10E3/UL (ref 1.4–7)
NEUTROPHILS NFR BLD AUTO: 58 %
NITRITE UR QL STRIP: NEGATIVE
PH UR STRIP: 7 [PH] (ref 5–7.5)
PLATELET # BLD AUTO: 237 X10E3/UL (ref 150–450)
POTASSIUM SERPL-SCNC: 4.5 MMOL/L (ref 3.5–5.2)
PROT SERPL-MCNC: 6.7 G/DL (ref 6–8.5)
PROT UR QL STRIP: NEGATIVE
RBC # BLD AUTO: 4.7 X10E6/UL (ref 4.14–5.8)
SODIUM SERPL-SCNC: 141 MMOL/L (ref 134–144)
SP GR UR STRIP: 1.01 (ref 1–1.03)
TRIGL SERPL-MCNC: 23 MG/DL (ref 0–149)
TSH SERPL DL<=0.005 MIU/L-ACNC: 0.86 UIU/ML (ref 0.45–4.5)
URATE SERPL-MCNC: 5.6 MG/DL (ref 3.8–8.4)
UROBILINOGEN UR STRIP-MCNC: 0.2 MG/DL (ref 0.2–1)
VLDLC SERPL CALC-MCNC: 8 MG/DL (ref 5–40)
WBC # BLD AUTO: 5.6 X10E3/UL (ref 3.4–10.8)

## 2025-06-02 PROCEDURE — 3008F BODY MASS INDEX DOCD: CPT | Mod: CPTII,,, | Performed by: INTERNAL MEDICINE

## 2025-06-02 PROCEDURE — 1160F RVW MEDS BY RX/DR IN RCRD: CPT | Mod: CPTII,,, | Performed by: INTERNAL MEDICINE

## 2025-06-02 PROCEDURE — 3075F SYST BP GE 130 - 139MM HG: CPT | Mod: CPTII,,, | Performed by: INTERNAL MEDICINE

## 2025-06-02 PROCEDURE — 99395 PREV VISIT EST AGE 18-39: CPT | Mod: ,,, | Performed by: INTERNAL MEDICINE

## 2025-06-02 PROCEDURE — 3080F DIAST BP >= 90 MM HG: CPT | Mod: CPTII,,, | Performed by: INTERNAL MEDICINE

## 2025-06-02 PROCEDURE — 36415 COLL VENOUS BLD VENIPUNCTURE: CPT | Mod: ,,, | Performed by: INTERNAL MEDICINE

## 2025-06-02 PROCEDURE — 1159F MED LIST DOCD IN RCRD: CPT | Mod: CPTII,,, | Performed by: INTERNAL MEDICINE

## 2025-06-02 RX ORDER — FLECAINIDE ACETATE 50 MG/1
50 TABLET ORAL DAILY PRN
COMMUNITY

## 2025-06-03 ENCOUNTER — RESULTS FOLLOW-UP (OUTPATIENT)
Dept: PRIMARY CARE CLINIC | Facility: CLINIC | Age: 35
End: 2025-06-03

## (undated) DEVICE — CABLE SUPREME EP GREY 150CM

## (undated) DEVICE — CATH SUPREME QPLR CRD-2 6F 120

## (undated) DEVICE — CATH ACHIEVE ADVANCE 20MM

## (undated) DEVICE — ADAPTER DBL MALE LL CLR POLY

## (undated) DEVICE — SHEATH BRITE TIP INTRO 11CM 9F

## (undated) DEVICE — INTRODUCER SHEATH 7F 11CM 35MM

## (undated) DEVICE — CATH FLEXABILITY F-J 8FR 115CM

## (undated) DEVICE — CABLE SURELINK 10 PIN 210CM

## (undated) DEVICE — DRESSING TRANS 4X4 TEGADERM

## (undated) DEVICE — Device

## (undated) DEVICE — INTRO 8.5FR 63CM SRO

## (undated) DEVICE — ELECTRODE REM PLYHSV RETURN 9

## (undated) DEVICE — PATCH ENSITE PRECISION NAVX SE

## (undated) DEVICE — SET TUBING COOL POINT IRR

## (undated) DEVICE — SHEATH 10FR 23CM BRITE TIP

## (undated) DEVICE — CATH VIEWFLEX XTRA ICE 9F 90CM

## (undated) DEVICE — CATH DECAPOLAR 6FRX110CM

## (undated) DEVICE — VALVE CONTROL COPILOT

## (undated) DEVICE — CATH DUO DECAPOLAR 7FRX95CM

## (undated) DEVICE — SHEATH FLEXCATH STEERABLE 12F

## (undated) DEVICE — SHEATH 11FR 11CM BRITE TIP

## (undated) DEVICE — PAD DEFIB CADENCE ADULT R2

## (undated) DEVICE — CATH ARCTIC FRONT ADVANCE 28MM

## (undated) DEVICE — CATH EXTENSION CABLE

## (undated) DEVICE — CATH INQUIRY EP 6F LG 2-5-2MM

## (undated) DEVICE — CABLE COAXIAL UMBILICAL

## (undated) DEVICE — SHEATH VERSACROSS 0.035IN

## (undated) DEVICE — INTRODUCER SWARTZ SL0 8.5FR

## (undated) DEVICE — SYS CLSR VASCADE MVP ID6-12FR

## (undated) DEVICE — NAMIC CONVENIENCE KIT

## (undated) DEVICE — SHEATH 10FR 11CM BRITE TIP

## (undated) DEVICE — CABLE CONNECTING CATHETER

## (undated) DEVICE — FLUID DELIVERY SET WITH FILTER

## (undated) DEVICE — INTRO SHEATH FST 14FR 12X50CM

## (undated) DEVICE — TUBING INJ M/F 1000 PSI 20IN

## (undated) DEVICE — SHEATH BRITE TIP 6FR X 11CM

## (undated) DEVICE — WIRE PGTL FLX SPRL  .025X230CM

## (undated) DEVICE — CABLE ELECTRICAL UMBILICAL

## (undated) DEVICE — GUIDEWIRE EMERALD 3MM 175X5CM